# Patient Record
Sex: FEMALE | Race: WHITE | HISPANIC OR LATINO | Employment: UNEMPLOYED | URBAN - METROPOLITAN AREA
[De-identification: names, ages, dates, MRNs, and addresses within clinical notes are randomized per-mention and may not be internally consistent; named-entity substitution may affect disease eponyms.]

---

## 2018-08-20 ENCOUNTER — APPOINTMENT (OUTPATIENT)
Dept: RADIOLOGY | Facility: MEDICAL CENTER | Age: 28
End: 2018-08-20
Attending: EMERGENCY MEDICINE
Payer: COMMERCIAL

## 2018-08-20 ENCOUNTER — HOSPITAL ENCOUNTER (OUTPATIENT)
Facility: MEDICAL CENTER | Age: 28
End: 2018-08-20
Attending: EMERGENCY MEDICINE | Admitting: INTERNAL MEDICINE
Payer: COMMERCIAL

## 2018-08-20 ENCOUNTER — PATIENT OUTREACH (OUTPATIENT)
Dept: HEALTH INFORMATION MANAGEMENT | Facility: OTHER | Age: 28
End: 2018-08-20

## 2018-08-20 VITALS
HEIGHT: 63 IN | WEIGHT: 131.17 LBS | TEMPERATURE: 98.2 F | OXYGEN SATURATION: 96 % | RESPIRATION RATE: 16 BRPM | BODY MASS INDEX: 23.24 KG/M2 | SYSTOLIC BLOOD PRESSURE: 103 MMHG | DIASTOLIC BLOOD PRESSURE: 67 MMHG | HEART RATE: 61 BPM

## 2018-08-20 DIAGNOSIS — N20.0 NEPHROLITHIASIS: ICD-10-CM

## 2018-08-20 DIAGNOSIS — N20.1 URETEROLITHIASIS: ICD-10-CM

## 2018-08-20 LAB
ALBUMIN SERPL BCP-MCNC: 4.2 G/DL (ref 3.2–4.9)
ALBUMIN/GLOB SERPL: 1.4 G/DL
ALP SERPL-CCNC: 83 U/L (ref 30–99)
ALT SERPL-CCNC: 24 U/L (ref 2–50)
AMORPH CRY #/AREA URNS HPF: PRESENT /HPF
ANION GAP SERPL CALC-SCNC: 10 MMOL/L (ref 0–11.9)
APPEARANCE UR: ABNORMAL
AST SERPL-CCNC: 22 U/L (ref 12–45)
BACTERIA #/AREA URNS HPF: ABNORMAL /HPF
BASOPHILS # BLD AUTO: 0.6 % (ref 0–1.8)
BASOPHILS # BLD: 0.07 K/UL (ref 0–0.12)
BILIRUB SERPL-MCNC: 0.3 MG/DL (ref 0.1–1.5)
BILIRUB UR QL STRIP.AUTO: NEGATIVE
BUN SERPL-MCNC: 15 MG/DL (ref 8–22)
CALCIUM SERPL-MCNC: 9.3 MG/DL (ref 8.5–10.5)
CHLORIDE SERPL-SCNC: 103 MMOL/L (ref 96–112)
CO2 SERPL-SCNC: 25 MMOL/L (ref 20–33)
COLOR UR: YELLOW
CREAT SERPL-MCNC: 0.97 MG/DL (ref 0.5–1.4)
EOSINOPHIL # BLD AUTO: 0.07 K/UL (ref 0–0.51)
EOSINOPHIL NFR BLD: 0.6 % (ref 0–6.9)
EPI CELLS #/AREA URNS HPF: ABNORMAL /HPF
ERYTHROCYTE [DISTWIDTH] IN BLOOD BY AUTOMATED COUNT: 39.5 FL (ref 35.9–50)
GLOBULIN SER CALC-MCNC: 2.9 G/DL (ref 1.9–3.5)
GLUCOSE SERPL-MCNC: 137 MG/DL (ref 65–99)
GLUCOSE UR STRIP.AUTO-MCNC: NEGATIVE MG/DL
HCG SERPL QL: NEGATIVE
HCT VFR BLD AUTO: 40.2 % (ref 37–47)
HGB BLD-MCNC: 13.8 G/DL (ref 12–16)
HYALINE CASTS #/AREA URNS LPF: ABNORMAL /LPF
IMM GRANULOCYTES # BLD AUTO: 0.06 K/UL (ref 0–0.11)
IMM GRANULOCYTES NFR BLD AUTO: 0.5 % (ref 0–0.9)
KETONES UR STRIP.AUTO-MCNC: 15 MG/DL
LEUKOCYTE ESTERASE UR QL STRIP.AUTO: ABNORMAL
LIPASE SERPL-CCNC: 15 U/L (ref 11–82)
LYMPHOCYTES # BLD AUTO: 1.34 K/UL (ref 1–4.8)
LYMPHOCYTES NFR BLD: 11.2 % (ref 22–41)
MAGNESIUM SERPL-MCNC: 1.7 MG/DL (ref 1.5–2.5)
MCH RBC QN AUTO: 30.4 PG (ref 27–33)
MCHC RBC AUTO-ENTMCNC: 34.3 G/DL (ref 33.6–35)
MCV RBC AUTO: 88.5 FL (ref 81.4–97.8)
MICRO URNS: ABNORMAL
MONOCYTES # BLD AUTO: 0.46 K/UL (ref 0–0.85)
MONOCYTES NFR BLD AUTO: 3.9 % (ref 0–13.4)
NEUTROPHILS # BLD AUTO: 9.93 K/UL (ref 2–7.15)
NEUTROPHILS NFR BLD: 83.2 % (ref 44–72)
NITRITE UR QL STRIP.AUTO: NEGATIVE
NRBC # BLD AUTO: 0 K/UL
NRBC BLD-RTO: 0 /100 WBC
PH UR STRIP.AUTO: >=9 [PH]
PLATELET # BLD AUTO: 292 K/UL (ref 164–446)
PMV BLD AUTO: 10.3 FL (ref 9–12.9)
POTASSIUM SERPL-SCNC: 3.7 MMOL/L (ref 3.6–5.5)
PROT SERPL-MCNC: 7.1 G/DL (ref 6–8.2)
PROT UR QL STRIP: 100 MG/DL
RBC # BLD AUTO: 4.54 M/UL (ref 4.2–5.4)
RBC # URNS HPF: >150 /HPF
RBC UR QL AUTO: ABNORMAL
SODIUM SERPL-SCNC: 138 MMOL/L (ref 135–145)
SP GR UR STRIP.AUTO: 1.03
UROBILINOGEN UR STRIP.AUTO-MCNC: 1 MG/DL
WBC # BLD AUTO: 11.9 K/UL (ref 4.8–10.8)
WBC #/AREA URNS HPF: ABNORMAL /HPF

## 2018-08-20 PROCEDURE — 84703 CHORIONIC GONADOTROPIN ASSAY: CPT

## 2018-08-20 PROCEDURE — 99236 HOSP IP/OBS SAME DATE HI 85: CPT | Performed by: INTERNAL MEDICINE

## 2018-08-20 PROCEDURE — 99285 EMERGENCY DEPT VISIT HI MDM: CPT

## 2018-08-20 PROCEDURE — G0378 HOSPITAL OBSERVATION PER HR: HCPCS

## 2018-08-20 PROCEDURE — 700105 HCHG RX REV CODE 258: Performed by: EMERGENCY MEDICINE

## 2018-08-20 PROCEDURE — 83690 ASSAY OF LIPASE: CPT

## 2018-08-20 PROCEDURE — 700102 HCHG RX REV CODE 250 W/ 637 OVERRIDE(OP): Performed by: INTERNAL MEDICINE

## 2018-08-20 PROCEDURE — 96375 TX/PRO/DX INJ NEW DRUG ADDON: CPT

## 2018-08-20 PROCEDURE — 85025 COMPLETE CBC W/AUTO DIFF WBC: CPT

## 2018-08-20 PROCEDURE — 700105 HCHG RX REV CODE 258: Performed by: INTERNAL MEDICINE

## 2018-08-20 PROCEDURE — 96365 THER/PROPH/DIAG IV INF INIT: CPT

## 2018-08-20 PROCEDURE — 83735 ASSAY OF MAGNESIUM: CPT

## 2018-08-20 PROCEDURE — 81001 URINALYSIS AUTO W/SCOPE: CPT

## 2018-08-20 PROCEDURE — A9270 NON-COVERED ITEM OR SERVICE: HCPCS | Performed by: INTERNAL MEDICINE

## 2018-08-20 PROCEDURE — 700111 HCHG RX REV CODE 636 W/ 250 OVERRIDE (IP): Performed by: EMERGENCY MEDICINE

## 2018-08-20 PROCEDURE — 87040 BLOOD CULTURE FOR BACTERIA: CPT

## 2018-08-20 PROCEDURE — 76830 TRANSVAGINAL US NON-OB: CPT

## 2018-08-20 PROCEDURE — 74176 CT ABD & PELVIS W/O CONTRAST: CPT

## 2018-08-20 PROCEDURE — 80053 COMPREHEN METABOLIC PANEL: CPT

## 2018-08-20 RX ORDER — TAMSULOSIN HYDROCHLORIDE 0.4 MG/1
0.4 CAPSULE ORAL DAILY
Qty: 30 CAP | Refills: 0 | Status: SHIPPED | OUTPATIENT
Start: 2018-08-20

## 2018-08-20 RX ORDER — OXYCODONE HYDROCHLORIDE 5 MG/1
2.5-5 TABLET ORAL
Qty: 30 TAB | Refills: 0 | Status: ON HOLD | OUTPATIENT
Start: 2018-08-20 | End: 2018-08-24

## 2018-08-20 RX ORDER — ONDANSETRON 4 MG/1
4 TABLET, ORALLY DISINTEGRATING ORAL EVERY 4 HOURS PRN
Qty: 20 TAB | Refills: 0 | Status: SHIPPED | OUTPATIENT
Start: 2018-08-20

## 2018-08-20 RX ORDER — ENALAPRILAT 1.25 MG/ML
1.25 INJECTION INTRAVENOUS EVERY 6 HOURS PRN
Status: DISCONTINUED | OUTPATIENT
Start: 2018-08-20 | End: 2018-08-20 | Stop reason: HOSPADM

## 2018-08-20 RX ORDER — OXYCODONE HYDROCHLORIDE 10 MG/1
10 TABLET ORAL
Status: DISCONTINUED | OUTPATIENT
Start: 2018-08-20 | End: 2018-08-20 | Stop reason: HOSPADM

## 2018-08-20 RX ORDER — ONDANSETRON 4 MG/1
4 TABLET, ORALLY DISINTEGRATING ORAL EVERY 4 HOURS PRN
Status: DISCONTINUED | OUTPATIENT
Start: 2018-08-20 | End: 2018-08-20 | Stop reason: HOSPADM

## 2018-08-20 RX ORDER — BISACODYL 10 MG
10 SUPPOSITORY, RECTAL RECTAL
Status: DISCONTINUED | OUTPATIENT
Start: 2018-08-20 | End: 2018-08-20 | Stop reason: HOSPADM

## 2018-08-20 RX ORDER — ACETAMINOPHEN 325 MG/1
650 TABLET ORAL EVERY 6 HOURS PRN
Status: DISCONTINUED | OUTPATIENT
Start: 2018-08-20 | End: 2018-08-20 | Stop reason: HOSPADM

## 2018-08-20 RX ORDER — SODIUM CHLORIDE 9 MG/ML
1000 INJECTION, SOLUTION INTRAVENOUS ONCE
Status: COMPLETED | OUTPATIENT
Start: 2018-08-20 | End: 2018-08-20

## 2018-08-20 RX ORDER — KETOROLAC TROMETHAMINE 30 MG/ML
15 INJECTION, SOLUTION INTRAMUSCULAR; INTRAVENOUS ONCE
Status: COMPLETED | OUTPATIENT
Start: 2018-08-20 | End: 2018-08-20

## 2018-08-20 RX ORDER — LABETALOL HYDROCHLORIDE 5 MG/ML
10 INJECTION, SOLUTION INTRAVENOUS EVERY 4 HOURS PRN
Status: DISCONTINUED | OUTPATIENT
Start: 2018-08-20 | End: 2018-08-20 | Stop reason: HOSPADM

## 2018-08-20 RX ORDER — PROMETHAZINE HYDROCHLORIDE 12.5 MG/1
12.5-25 SUPPOSITORY RECTAL EVERY 4 HOURS PRN
Status: DISCONTINUED | OUTPATIENT
Start: 2018-08-20 | End: 2018-08-20 | Stop reason: HOSPADM

## 2018-08-20 RX ORDER — MORPHINE SULFATE 10 MG/ML
6 INJECTION, SOLUTION INTRAMUSCULAR; INTRAVENOUS ONCE
Status: COMPLETED | OUTPATIENT
Start: 2018-08-20 | End: 2018-08-20

## 2018-08-20 RX ORDER — POLYETHYLENE GLYCOL 3350 17 G/17G
1 POWDER, FOR SOLUTION ORAL
Status: DISCONTINUED | OUTPATIENT
Start: 2018-08-20 | End: 2018-08-20 | Stop reason: HOSPADM

## 2018-08-20 RX ORDER — AMOXICILLIN 250 MG
2 CAPSULE ORAL 2 TIMES DAILY
Status: DISCONTINUED | OUTPATIENT
Start: 2018-08-20 | End: 2018-08-20 | Stop reason: HOSPADM

## 2018-08-20 RX ORDER — SODIUM CHLORIDE 9 MG/ML
INJECTION, SOLUTION INTRAVENOUS CONTINUOUS
Status: DISCONTINUED | OUTPATIENT
Start: 2018-08-20 | End: 2018-08-20 | Stop reason: HOSPADM

## 2018-08-20 RX ORDER — CLONIDINE HYDROCHLORIDE 0.1 MG/1
0.1 TABLET ORAL EVERY 6 HOURS PRN
Status: DISCONTINUED | OUTPATIENT
Start: 2018-08-20 | End: 2018-08-20 | Stop reason: HOSPADM

## 2018-08-20 RX ORDER — PROMETHAZINE HYDROCHLORIDE 25 MG/1
12.5-25 TABLET ORAL EVERY 4 HOURS PRN
Status: DISCONTINUED | OUTPATIENT
Start: 2018-08-20 | End: 2018-08-20 | Stop reason: HOSPADM

## 2018-08-20 RX ORDER — MORPHINE SULFATE 4 MG/ML
4 INJECTION, SOLUTION INTRAMUSCULAR; INTRAVENOUS
Status: DISCONTINUED | OUTPATIENT
Start: 2018-08-20 | End: 2018-08-20 | Stop reason: HOSPADM

## 2018-08-20 RX ORDER — CEFDINIR 300 MG/1
300 CAPSULE ORAL 2 TIMES DAILY
Qty: 14 CAP | Refills: 0 | Status: ON HOLD | OUTPATIENT
Start: 2018-08-20 | End: 2018-08-24

## 2018-08-20 RX ORDER — OXYCODONE HYDROCHLORIDE 5 MG/1
5 TABLET ORAL
Status: DISCONTINUED | OUTPATIENT
Start: 2018-08-20 | End: 2018-08-20 | Stop reason: HOSPADM

## 2018-08-20 RX ORDER — ONDANSETRON 2 MG/ML
4 INJECTION INTRAMUSCULAR; INTRAVENOUS ONCE
Status: COMPLETED | OUTPATIENT
Start: 2018-08-20 | End: 2018-08-20

## 2018-08-20 RX ORDER — KETOROLAC TROMETHAMINE 30 MG/ML
30 INJECTION, SOLUTION INTRAMUSCULAR; INTRAVENOUS EVERY 6 HOURS PRN
Status: DISCONTINUED | OUTPATIENT
Start: 2018-08-20 | End: 2018-08-20 | Stop reason: HOSPADM

## 2018-08-20 RX ORDER — ONDANSETRON 2 MG/ML
4 INJECTION INTRAMUSCULAR; INTRAVENOUS EVERY 4 HOURS PRN
Status: DISCONTINUED | OUTPATIENT
Start: 2018-08-20 | End: 2018-08-20 | Stop reason: HOSPADM

## 2018-08-20 RX ADMIN — MORPHINE SULFATE 6 MG: 10 INJECTION INTRAVENOUS at 06:01

## 2018-08-20 RX ADMIN — SODIUM CHLORIDE: 9 INJECTION, SOLUTION INTRAVENOUS at 09:10

## 2018-08-20 RX ADMIN — CEFTRIAXONE SODIUM 1 G: 1 INJECTION, POWDER, FOR SOLUTION INTRAMUSCULAR; INTRAVENOUS at 06:56

## 2018-08-20 RX ADMIN — ONDANSETRON 4 MG: 2 INJECTION, SOLUTION INTRAMUSCULAR; INTRAVENOUS at 03:36

## 2018-08-20 RX ADMIN — OXYCODONE HYDROCHLORIDE 10 MG: 10 TABLET ORAL at 08:28

## 2018-08-20 RX ADMIN — SODIUM CHLORIDE 1000 ML: 9 INJECTION, SOLUTION INTRAVENOUS at 03:36

## 2018-08-20 RX ADMIN — KETOROLAC TROMETHAMINE 15 MG: 30 INJECTION, SOLUTION INTRAMUSCULAR at 03:36

## 2018-08-20 ASSESSMENT — PATIENT HEALTH QUESTIONNAIRE - PHQ9
SUM OF ALL RESPONSES TO PHQ9 QUESTIONS 1 AND 2: 0
1. LITTLE INTEREST OR PLEASURE IN DOING THINGS: NOT AT ALL
2. FEELING DOWN, DEPRESSED, IRRITABLE, OR HOPELESS: NOT AT ALL

## 2018-08-20 ASSESSMENT — PAIN SCALES - WONG BAKER: WONGBAKER_NUMERICALRESPONSE: HURTS AS MUCH AS POSSIBLE

## 2018-08-20 ASSESSMENT — COPD QUESTIONNAIRES
DURING THE PAST 4 WEEKS HOW MUCH DID YOU FEEL SHORT OF BREATH: NONE/LITTLE OF THE TIME
IN THE PAST 12 MONTHS DO YOU DO LESS THAN YOU USED TO BECAUSE OF YOUR BREATHING PROBLEMS: DISAGREE/UNSURE
DO YOU EVER COUGH UP ANY MUCUS OR PHLEGM?: NO/ONLY WITH OCCASIONAL COLDS OR INFECTIONS
DURING THE PAST 4 WEEKS HOW MUCH DID YOU FEEL SHORT OF BREATH: NONE/LITTLE OF THE TIME
COPD SCREENING SCORE: 0
HAVE YOU SMOKED AT LEAST 100 CIGARETTES IN YOUR ENTIRE LIFE: NO/DON'T KNOW
DO YOU EVER COUGH UP ANY MUCUS OR PHLEGM?: NO/ONLY WITH OCCASIONAL COLDS OR INFECTIONS
HAVE YOU SMOKED AT LEAST 100 CIGARETTES IN YOUR ENTIRE LIFE: NO/DON'T KNOW
COPD SCREENING SCORE: 0

## 2018-08-20 ASSESSMENT — LIFESTYLE VARIABLES
ALCOHOL_USE: NO
EVER_SMOKED: NEVER
EVER_SMOKED: NEVER

## 2018-08-20 ASSESSMENT — PAIN SCALES - GENERAL
PAINLEVEL_OUTOF10: 10
PAINLEVEL_OUTOF10: 10

## 2018-08-20 NOTE — ED PROVIDER NOTES
"ED Provider Note    Scribed for Colleen Montilla M.D. by Celestina Pineda. 8/20/2018  3:12 AM    Means of arrival: Walk in  History obtained from: Patient   History limited by: None     CHIEF COMPLAINT  Chief Complaint   Patient presents with   • LLQ Pain     since aprox 2200 (8/19). Pt reports 10/10 \"stabbing\" non radiating pain     HPI  Katja Salomon is a 28 y.o. otherwise healthy female who presents to the Emergency Department for 1 day history of abdominal pain. She endorses severe \"pressure-like\" left lower quadrant abdominal pain onset 3 pm.  She denies radiation of the pain.  Patient reports the pain subsided since onset then returned around 9 pm with worsening severity. Patient reports associated multiple episodes of nonbloody, nonbilious vomiting. She denies associated diarrhea, dysuria, or blood in urine. Patient is currently on her menstrual period in addition to reporting a recently negative home pregnancy test.    REVIEW OF SYSTEMS  Pertinent positive include suprapubic pain, vomiting.   Pertinent negative include diarrhea, dysuria, or hematuria.   All other systems reviewed and are negative. C.     PAST MEDICAL HISTORY  none    SOCIAL HISTORY  Social History     Social History Main Topics   • Smoking status: Never Smoker   • Smokeless tobacco: Never Used   • Alcohol use No   • Drug use: No   • Sexual activity: None noted      SURGICAL HISTORY  patient denies any surgical history    CURRENT MEDICATIONS  Home Medications     Reviewed by Disha Olvera R.N. (Registered Nurse) on 08/20/18 at 0305  Med List Status: Complete   Medication Last Dose Status        Patient Denies Taking any Medications                     ALLERGIES  No Known Allergies    PHYSICAL EXAM   VITAL SIGNS: /76   Pulse 65   Temp 36.3 °C (97.3 °F)   Resp 16   Ht 1.6 m (5' 2.99\")   Wt 59.4 kg (130 lb 15.3 oz)   SpO2 100%   BMI 23.20 kg/m²    Constitutional: Non toxic appearing  female, Alert in no apparent " "distress.  HENT: Normocephalic, Atraumatic. Bilateral external ears normal. Nose normal.  Moist mucous membranes.  Oropharynx clear.  Eyes: Pupils are equal and reactive. Conjunctiva normal.   Neck: Supple, full range of motion  Heart: Regular rate and rhythm.  No murmurs.    Lungs: No respiratory distress, normal work of breathing. Lungs clear to auscultation bilaterally.  Abdomen: Left lower quadrant tenderness to palpation, No rebound, No guarding, Soft, no distention.   Musculoskeletal: Atraumatic. No obvious deformities noted.  No lower extremity edema.  Skin: Warm, Dry.  No erythema, No rash.   Neurologic: Alert and oriented x3. Moving all extremities spontaneously without focal deficits.  Psychiatric: Affect normal, Mood normal, Appears appropriate and not intoxicated.      DIAGNOSTIC STUDIES    LABS  Personally reviewed by me  Labs Reviewed   CBC WITH DIFFERENTIAL - Abnormal; Notable for the following:        Result Value    WBC 11.9 (*)     Neutrophils-Polys 83.20 (*)     Lymphocytes 11.20 (*)     Neutrophils (Absolute) 9.93 (*)     All other components within normal limits   COMP METABOLIC PANEL - Abnormal; Notable for the following:     Glucose 137 (*)     All other components within normal limits   URINALYSIS,CULTURE IF INDICATED - Abnormal; Notable for the following:     Character Turbid (*)     Ph >=9.0 (*)     Ketones 15 (*)     Protein 100 (*)     Leukocyte Esterase Small (*)     Occult Blood Large (*)     All other components within normal limits   URINE MICROSCOPIC (W/UA) - Abnormal; Notable for the following:     WBC 10-20 (*)     RBC >150 (*)     Bacteria Moderate (*)     Epithelial Cells Many (*)     Hyaline Cast 3-5 (*)     All other components within normal limits   LIPASE   HCG QUAL SERUM   ESTIMATED GFR   BLOOD CULTURE    Narrative:     Per Hospital Policy: Only change Specimen Src: to \"Line\" if  specified by physician order.   BLOOD CULTURE    Narrative:     Per Hospital Policy: Only change " "Specimen Src: to \"Line\" if  specified by physician order.      RADIOLOGY  Personally reviewed by me    CT-RENAL COLIC EVALUATION(A/P W/O)   Final Result         1. Obstructing 5 mm calculus in the distal left ureter with moderate left hydronephrosis.      2. No evidence of inflammatory change in the abdomen or pelvis.  The study is however limited due to nonuse of intravenous contrast      US-GYN-PELVIS TRANSVAGINAL   Final Result            1. Unremarkable uterus and bilateral ovaries.          ED COURSE  Vitals:    08/20/18 0500 08/20/18 0600 08/20/18 0630 08/20/18 0700   BP:       Pulse: 70 72 74 73   Resp: 16 15 18 19   Temp:       SpO2: 99% 96% 95% 97%   Weight:       Height:         Medications administered:  Medications   cefTRIAXone (ROCEPHIN) 1 g in  mL IVPB (1 g Intravenous New Bag 8/20/18 0656)   NS infusion 1,000 mL (0 mL Intravenous Stopped 8/20/18 0435)   ketorolac (TORADOL) injection 15 mg (15 mg Intravenous Given 8/20/18 0336)   ondansetron (ZOFRAN) syringe/vial injection 4 mg (4 mg Intravenous Given 8/20/18 0336)   morphine (pf) 10 mg/ml 10 MG/ML injection 6 mg (6 mg Intravenous Given 8/20/18 0601)       3:12 AM Patient seen and examined at bedside. The patient presents with left lower quadrant abdominal pain. Ordered for HCG qual serum, UA culture, CBC, CMP, Lipase to evaluate. The patient will be resuscitated with 1L NS IV secondary to vomiting and possible dehydration. Patient will be treated with 4 mg Zofran and 15 mg Toradol for her symptoms.     5:17 AM Upon reassessment, patient is resting with normal vital signs. Patient reports symptoms improvement after treatment with IV fluids but reports persistent abdominal pain. Patient will be treated with additional 10 mg/ml morphine. Ordered US gyn pelvis and CT renal colic for further evaluation.     MEDICAL DECISION MAKING  Patient is otherwise healthy female who presents with acute onset of left-sided abdominal pain today.  She is afebrile " with reassuring vitals on arrival and nontoxic-appearing.  Exam not concerning for bowel obstruction or perforation or appendicitis.  Patient is not pregnant.  Labs demonstrate evidence of mild leukocytosis without other significant abnormality.  Pelvic ultrasound without evidence of ovarian torsion or cyst.  Urinalysis demonstrates possible infection in addition to blood, therefore CT scan was completed which demonstrates 5 mm obstructing ureteral stone.    I discussed the case with Dr. Foreman, urologist on-call, who recommends admission with antibiotic therapy in case of developing infection.  He will evaluate the patient for stent placement later today.    I discussed the patient with Dr. Baxter, hospitalist on-call, who accepts admission of the patient.  Patient was updated on plan of care and is stable at this time for transfer to the floor.        FINAL IMPRESSION  1. Nephrolithiasis        -ADMIT-       Results, diagnoses, and treatment options were discussed with the patient and/or family. Patient verbalized understanding of plan of care.    New Prescriptions    No medications on file     Celestina WADE (Nikita), am scribing for, and in the presence of, Colleen Montilla M.D..  Electronically signed by: Celestina Pineda (Stephaniee), 8/20/2018  IColleen M.D. personally performed the services described in this documentation, as scribed by Celestina Pineda in my presence, and it is both accurate and complete.    The note accurately reflects work and decisions made by me.  Colleen Montilla  8/20/2018  7:16 AM

## 2018-08-20 NOTE — DISCHARGE SUMMARY
"Discharge Summary    CHIEF COMPLAINT ON ADMISSION  Chief Complaint   Patient presents with   • LLQ Pain     since aprox 2200 (8/19). Pt reports 10/10 \"stabbing\" non radiating pain       Reason for Admission  Stomach Pain      Admission Date  8/20/2018    CODE STATUS  Full Code    HPI & HOSPITAL COURSE  This is a 28 y.o. female without much past medical history, who was visiting from [facility], who was doing well until the day prior to admission, when she has had acute onset left lower quadrant pain, which she described as sharp, constant, and rated 10 out of 10 in severity, without any radiation. The pain was so severe that she felt nauseated, with episodes of vomiting.  She denied any fevers, but felt chilly.  She denied any other symptoms such as shortness of breath, diarrhea, or dysuria.      The patient was initially evaluated in the emergency department, was maintaining good hemodynamics, and afebrile.  Initial blood workup showed WBC count of 11,900 without bandemia.  CMP was unremarkable.  Urinalysis showed 10-20 WBC, with greater than 150 RBC, small leukocyte Estrace, and moderate bacteria.  CT renal colic was obtained which showed an obstructing 5 mm calculus in the distal left ureter with moderate left hydronephrosis, with no evidence of inflammatory changes.  Urology was consulted, and patient was given a dose of ceftriaxone Toradol and morphine.      She was started on aggressive IV fluids, and pain control, along with Flomax.  She was also started on antibiotics empirically.  Urology was consulted, and gave the opinion that she had a 50-70% chance of passing the stone spontaneously without requiring operative intervention.  As her pain was much improved, urology recommended that she can be discharged with continued pain control, empiric antibiotics, and antiemetics.  She is counseled to strain her urine, and to continue with increased oral fluid intake.  She was given the option to follow-up " with Dr. Vanegas of urology if she wants to, as she lives in [place].     She remained hemodynamically stable and afebrile.  With her clinical improvement, she was deemed ready to be discharged from the hospital she did not have any further inpatient needs.  Patient felt comfortable going home.     Therefore, she is discharged in fair and stable condition to home with close outpatient follow-up.      Discharge Date  8/20/2018      FOLLOW UP ITEMS POST DISCHARGE  -Continue Omnicef, Zofran, and as needed oxycodone for pain.  I will also continue her on tamsulosin for medical expulsive therapy.  She will strain her urine.  Counseled to increase oral fluid intake.  She can follow-up with Dr. Vanegas if she chooses.  Follow-up with PCP.    DISCHARGE DIAGNOSES  Principal Problem:    Left ureterolithiasis with moderate hydronephrosis POA: Yes  Resolved Problems:    * No resolved hospital problems. *      FOLLOW UP    [address] [address] [phone]    Please call to schedule your appointment. They do have a sliding fee scale based on income. Thank you       MEDICATIONS ON DISCHARGE     Medication List      START taking these medications      Instructions   cefdinir 300 MG Caps  Commonly known as:  OMNICEF   Take 1 Cap by mouth 2 times a day for 7 days.  Dose:  300 mg     ondansetron 4 MG Tbdp  Commonly known as:  ZOFRAN ODT   Take 1 Tab by mouth every four hours as needed for Nausea.  Dose:  4 mg     oxyCODONE immediate-release 5 MG Tabs  Commonly known as:  ROXICODONE   Take 0.5-1 Tabs by mouth every 3 hours as needed for Severe Pain for up to 15 days.  Dose:  2.5-5 mg     tamsulosin 0.4 MG capsule  Commonly known as:  FLOMAX   Take 1 Cap by mouth every day.  Dose:  0.4 mg            Allergies  No Known Allergies    DIET  Orders Placed This Encounter   Procedures   • Diet Order Regular     Standing Status:   Standing     Number of Occurrences:   1     Order Specific  Question:   Diet:     Answer:   Regular [1]       ACTIVITY  As tolerated.  Weight bearing as tolerated    CONSULTATIONS  Urology    PROCEDURES  None    LABORATORY  Lab Results   Component Value Date    SODIUM 138 08/20/2018    POTASSIUM 3.7 08/20/2018    CHLORIDE 103 08/20/2018    CO2 25 08/20/2018    GLUCOSE 137 (H) 08/20/2018    BUN 15 08/20/2018    CREATININE 0.97 08/20/2018        Lab Results   Component Value Date    WBC 11.9 (H) 08/20/2018    HEMOGLOBIN 13.8 08/20/2018    HEMATOCRIT 40.2 08/20/2018    PLATELETCT 292 08/20/2018        For purpose of time-based billing, I spent a total face-to-face time 61 minutes, with same-day admission and discharge, including reviewing related clinical data, obtaining relevant patient information, managing medical issues, patient counseling, and coordination of care, with > 50% spent coordinating  care, and counseling regarding diagnosis, risk and benefits of various treatment plans, and expected outcomes.

## 2018-08-20 NOTE — H&P
Hospital Medicine History & Physical Note    Date of Service  8/20/2018    Primary Care Physician  No primary care provider on file.    Consultants  Dr. Foreman - urology    Code Status  Full    Chief Complaint  LLQ pain    History of Presenting Illness  28 y.o. female without much past medical history, who was visiting from [facility], who was doing well until yesterday, when she has had acute onset left lower quadrant pain, which she described as sharp, constant, and rated 10 out of 10 in severity, without any radiation. The pain was so severe that she felt nauseated, with episodes of vomiting.  She denied any fevers, but felt chilly.  She denied any other symptoms such as shortness of breath, diarrhea, or dysuria.  She then decided to go to the ED today.    ED course:  The patient was initially evaluated in the emergency department, was maintaining good hemodynamics, and afebrile.  Initial blood workup showed WBC count of 11,900 without bandemia.  CMP was unremarkable.  Urinalysis showed 10-20 WBC, with greater than 150 RBC, small leukocyte Estrace, and moderate bacteria.  CT renal colic was obtained which showed an obstructing 5 mm calculus in the distal left ureter with moderate left hydronephrosis, with no evidence of inflammatory changes.  Urology was consulted, and patient was given a dose of ceftriaxone Toradol and morphine.  She was subsequently admitted to the hospitalist service.    Review of Systems  ROS    Past Medical History  No medical issues    Surgical History   has no past surgical history on file.     Family History  Reviewed. Non-contributory.      Social History   reports that she has never smoked. She has never used smokeless tobacco. She reports that she does not drink alcohol or use drugs.    Allergies  No Known Allergies    Medications  None       Physical Exam  Blood Pressure: 110/70   Temperature: 37 °C (98.6 °F)   Pulse: 67   Respiration: 14   Pulse Oximetry: 97 %     Physical  Exam   Constitutional: She is oriented to person, place, and time. She appears well-developed and well-nourished. No distress.   HENT:   Head: Normocephalic and atraumatic.   Mouth/Throat: No oropharyngeal exudate.   Eyes: Pupils are equal, round, and reactive to light. Conjunctivae are normal. No scleral icterus.   Neck: Normal range of motion. Neck supple.   Cardiovascular: Normal rate and regular rhythm.  Exam reveals no gallop and no friction rub.    No murmur heard.  Pulmonary/Chest: Effort normal and breath sounds normal. No respiratory distress. She has no wheezes. She has no rales. She exhibits no tenderness.   Abdominal: Soft. Bowel sounds are normal. She exhibits no distension. There is tenderness ( LLQ). There is no rebound and no guarding.   Musculoskeletal: Normal range of motion. She exhibits no edema or tenderness.   Lymphadenopathy:     She has no cervical adenopathy.   Neurological: She is alert and oriented to person, place, and time. No cranial nerve deficit. Coordination normal.   Skin: Skin is warm and dry. No rash noted. She is not diaphoretic. No erythema. No pallor.   Psychiatric: She has a normal mood and affect. Her behavior is normal. Judgment and thought content normal.   Nursing note and vitals reviewed.      Laboratory:  Recent Labs      08/20/18   0300   WBC  11.9*   RBC  4.54   HEMOGLOBIN  13.8   HEMATOCRIT  40.2   MCV  88.5   MCH  30.4   MCHC  34.3   RDW  39.5   PLATELETCT  292   MPV  10.3     Recent Labs      08/20/18   0300   SODIUM  138   POTASSIUM  3.7   CHLORIDE  103   CO2  25   GLUCOSE  137*   BUN  15   CREATININE  0.97   CALCIUM  9.3     Recent Labs      08/20/18   0300   ALTSGPT  24   ASTSGOT  22   ALKPHOSPHAT  83   TBILIRUBIN  0.3   LIPASE  15   GLUCOSE  137*                 No results found for: TROPONINI    Urinalysis:    Recent Labs      08/20/18   0338   SPECGRAVITY  1.032   GLUCOSEUR  Negative   KETONES  15*   NITRITE  Negative   LEUKESTERAS  Small*   WBCURINE  10-20*    RBCURINE  >150*   BACTERIA  Moderate*   EPITHELCELL  Many*        Imaging:  CT-RENAL COLIC EVALUATION(A/P W/O)   Final Result         1. Obstructing 5 mm calculus in the distal left ureter with moderate left hydronephrosis.      2. No evidence of inflammatory change in the abdomen or pelvis.  The study is however limited due to nonuse of intravenous contrast      US-GYN-PELVIS TRANSVAGINAL   Final Result            1. Unremarkable uterus and bilateral ovaries.            Assessment/Plan:  I anticipate this patient is appropriate for observation status at this time.    * Left ureterolithiasis with moderate hydronephrosis- (present on admission)   Assessment & Plan    -Urology has been consulted, anticipate cystoscopy with ureteral stent placement today.  I will keep her n.p.o. for now.  -I will cover her with IV ceftriaxone.  Pain control with IV Toradol, oxycodone, and IV morphine, along with as needed antiemetics.  -Aggressive IV fluid rehydration with normal saline at 150 cc/h.            VTE prophylaxis: SCD

## 2018-08-20 NOTE — ED TRIAGE NOTES
"Katja Chana Salomon  28 y.o. female  Chief Complaint   Patient presents with   • LLQ Pain     since aprox 2200 (8/19). Pt reports 10/10 \"stabbing\" non radiating pain       Pt amb to triage with steady gait for above complaint. Pt denies diarrhea, constipation or problems urinating.   LMP 8/13  Pt Panamanian speaking only  Language line used (Claire #[id number])  Pt is alert and oriented, speaking in full sentences, follows commands and responds appropriately to questions. NAD. Resp are even and unlabored.  Pt placed in lobby. Pt educated on triage process. Pt encouraged to alert staff for any changes.    "

## 2018-08-20 NOTE — ASSESSMENT & PLAN NOTE
-Urology has been consulted, anticipate cystoscopy with ureteral stent placement today.  I will keep her n.p.o. for now.  -I will cover her with IV ceftriaxone.  Pain control with IV Toradol, oxycodone, and IV morphine, along with as needed antiemetics.  -Aggressive IV fluid rehydration with normal saline at 150 cc/h.

## 2018-08-20 NOTE — DISCHARGE INSTRUCTIONS
Discharge Instructions    Discharged to home by car with relative. Discharged via wheelchair, hospital escort: Yes.  Special equipment needed: Not Applicable    Be sure to schedule a follow-up appointment with your primary care doctor or any specialists as instructed.     Discharge Plan:   Diet Plan: Discussed  Activity Level: Discussed  Confirmed Follow up Appointment: Patient to Call and Schedule Appointment  Confirmed Symptoms Management: Discussed  Medication Reconciliation Updated: Yes  Influenza Vaccine Indication: Patient Refuses    I understand that a diet low in cholesterol, fat, and sodium is recommended for good health. Unless I have been given specific instructions below for another diet, I accept this instruction as my diet prescription.   Other diet: Regular    Special Instructions: None    · Is patient discharged on Warfarin / Coumadin?   No     Depression / Suicide Risk    As you are discharged from this [facility] facility, it is important to learn how to keep safe from harming yourself.    Recognize the warning signs:  · Abrupt changes in personality, positive or negative- including increase in energy   · Giving away possessions  · Change in eating patterns- significant weight changes-  positive or negative  · Change in sleeping patterns- unable to sleep or sleeping all the time   · Unwillingness or inability to communicate  · Depression  · Unusual sadness, discouragement and loneliness  · Talk of wanting to die  · Neglect of personal appearance   · Rebelliousness- reckless behavior  · Withdrawal from people/activities they love  · Confusion- inability to concentrate     If you or a loved one observes any of these behaviors or has concerns about self-harm, here's what you can do:  · Talk about it- your feelings and reasons for harming yourself  · Remove any means that you might use to hurt yourself (examples: pills, rope, extension cords, firearm)  · Get professional help from the community  (Mental Health, Substance Abuse, psychological counseling)  · Do not be alone:Call your Safe Contact- someone whom you trust who will be there for you.  · Call your local CRISIS HOTLINE 799-5989 or 821-545-9914  · Call your local Children's Mobile Crisis Response Team [place] (814) 572-6813 or www.LXSN  · Call the toll free National Suicide Prevention Hotlines   · National Suicide Prevention Lifeline 572-107-KPCX (1825)  · National Hope Line Network 800-SUICIDE (605-9060)

## 2018-08-20 NOTE — CONSULTS
DATE OF SERVICE:  08/20/2018    UROLOGIC CONSULTATION    CONSULTATION REQUESTED BY:  Dr. Montilla regarding left distal ureterolithiasis   with intractable flank pain.    CONSULTATION PERFORMED BY:  Von Vanegas MD, [facility] [place].    CHIEF COMPLAINT:  This is a 28-year-old  female visiting from [place], [place] who has developed 10/10 left flank and lower quadrant pain.    HISTORY OF PRESENT ILLNESS:  The patient states she was in her usual state of   health until 08/19 when she began experiencing left lower quadrant pain.  She   describes the pain as sharp, intermittent and then became constant and rated   as 10/10 in severity without any fever or chills.  She denies any urgency,   frequency, or gross hematuria, but decided to go to the emergency room where a   CAT scan was performed and shows a 5 mm stone in the distal ureter.    PAST MEDICAL HISTORY:  Negative for nephrolithiasis.  She denies a history of   hypertension, diabetes or reactive airway disease.    PAST SURGICAL HISTORY:  None.    MEDICATIONS:  She is on no medications prior to admission.    ALLERGIES:  No known drug allergies.    SOCIAL HISTORY:  She is single, traveling with a friend from [place].  She   denies tobacco use, denies alcohol use and denies any drug use.    FAMILY HISTORY:  Negative for nephrolithiasis.    REVIEW OF SYSTEMS:  GENERAL:  She denies any significant weight gain or weight loss.  She has not   had fever, but has had chills with this severe pain when it occurs.  HEENT:  She denies any hearing change, loss of vision or difficulty   swallowing.  RESPIRATORY:  Denies any shortness of breath or cough.  CARDIOVASCULAR:  Denies skipped beats or dyspnea on exertion.  ABDOMEN:  She has had left lower quadrant abdominal pain, but denies blood in   the stool and she has had some nausea and episode of vomiting.  GENITOURINARY:  See HPI.  ENDOCRINE:  Denies excessive feeling of too hot or too cold.  MUSCULOSKELETAL:  Denies  arthralgias, myalgias.  NEUROLOGIC:  Denies seizures or discoordination.  PSYCHIATRIC:  Denies any anxiety or depression.    PHYSICAL EXAMINATION:  VITAL SIGNS:  Her blood pressure is 110/70, pulse is in the mid 60s,   respirations 16 and symmetric.  GENERAL:  She currently is a well-developed, well-nourished woman in no acute   distress, resting comfortably with a friend.  HEENT:  Normocephalic, atraumatic.  Pupils equal, round.  Sclerae are   nonicteric.  NECK:  Supple.  CHEST:  Clear to auscultation bilaterally.  I did not appreciate any wheezing.  CARDIOVASCULAR:  Regular rate and rhythm without a murmur.  ABDOMEN:  Soft, nondistended, mildly tender to deep palpation in the left   lower quadrant, but no rebound or guarding and no palpable masses.  BACK:  Without costovertebral tenderness.  EXTREMITIES:  Without clubbing, cyanosis or edema.  Homans sign negative.  NEUROLOGIC:  Cranial nerves II-XII intact.  Motor and sensory examination   nonfocal.    LABORATORIES AND STUDIES:  The patient's white count was 11,900.  Normal   hemoglobin.  Normal creatinine.  CT scan of the abdomen and pelvis, I   personally reviewed that, shows a 4.9 distal ureteral stone with associated   hydroureteronephrosis.  Beta-hCG was negative and the urine showed a trace or   few white cells, but no significant bacteria.    ASSESSMENT:  Distal left ureterolithiasis.    PLAN AND RECOMMENDATIONS:  I had a discussion with the patient in the presence   of her friend that essentially a 5 mm stone has a 50-70% chance of passing   spontaneously without requiring operative intervention.  She was admitted due   to the fact that she had significant pain on admission with pain management.    She is doing better.  I explained to the patient that if the stone does not   pass within 5 weeks that medical attention would be recommended as leaving a   stone in place can result in a stricture and subsequent loss of kidney   function.  At this point in  time, she has been n.p.o. at my request, but I   will recommend that she go ahead and start on a diet and potentially be   discharged later today with oral analgesics, Zofran for nausea, and tamsulosin   for possible medical expulsive therapy.  The patient appreciated information   and can follow up with me or in [place] whatever she desires.       ____________________________________     MD SAMIR MART / TRENT    DD:  08/20/2018 12:28:03  DT:  08/20/2018 13:01:51    D#:  [id number]  Job#:  [id number]

## 2018-08-20 NOTE — ED NOTES
Pt states they feel like they are going to pass out and started to feel nauseous. Pt also states the pain is getting worse. Pt was given warm blanket. Triage RN nurse notified.

## 2018-08-20 NOTE — ED NOTES
Pt updated on POC, pt rr even and unlabored. Pt has no new complaints at this time, will continue to monitor pt.

## 2018-08-20 NOTE — DISCHARGE PLANNING
Care Transition Team Assessment    Met with pt, boyfriend and daughter at bedside. Pt is Northern Irish speaking, translation with Adria Smith. According to pt she lives with her daughter and boyfriend. Pt said that she lives in [place] and is currently on vacation. Independent at baseline, does not use assistive devices. Boyfriend confirmed he will provide transport at discharge. Pt referred to PFA. No other needs identified at this time.    Information Source  Orientation : Oriented x 4  Information Given By: Patient  Informant's Name: Katja Salomon    Readmission Evaluation  Is this a readmission?: No    Interdisciplinary Discharge Planning  Does Admitting Nurse Feel This Could be a Complex Discharge?: No  Primary Care Physician: Loreto Cortez  Lives with - Patient's Self Care Capacity: Child Less than 18 Years of Age (Boyfriend)  Patient or legal guardian wants to designate a caregiver (see row info): No  Support Systems:  (Boyfriend)  Housing / Facility: [address]  Do You Take your Prescribed Medications Regularly:  (Not on any medication)  Able to Return to Previous ADL's: Yes  Mobility Issues: No  Prior Services: None  Patient Expects to be Discharged to:: Home  Assistance Needed: No  Durable Medical Equipment: Not Applicable    Discharge Preparedness  What are your discharge supports?:  (Boyfriend)  Prior Functional Level: Ambulatory, Independent with Activities of Daily Living, Independent with Medication Management  Difficulity with ADLs: None  Difficulity with IADLs: None    Functional Assesment  Prior Functional Level: Ambulatory, Independent with Activities of Daily Living, Independent with Medication Management    Finances  Prescription Coverage: Yes    Discharge Risks or Barriers  Discharge risks or barriers?: No    Anticipated Discharge Information  Anticipated discharge disposition: Home  Discharge Address: [address] [place] [address]  Discharge Contact Phone Number: [phone] -  patient

## 2018-08-20 NOTE — PROGRESS NOTES
Pt arrive to floor. Pt ambulate to bed with steady gait. Pt wanting to leave hospital to be treated in [place] due to her insurance. MD Aamir notified. MD Aamir to pt bedside.

## 2018-08-21 NOTE — PROGRESS NOTES
Tech Marthony at bedside for help with translation. Discharge instructions, medications and follow-up reviewed with pt, pt verbalized understanding and denies questions. Discharge paperwork and prescriptions (x4) given to pt. PIV removed, TeleBox removed, armband removed. Pt decline wheelchair or hospital escort. Pt walk off floor with her family.

## 2018-08-22 ENCOUNTER — HOSPITAL ENCOUNTER (INPATIENT)
Facility: MEDICAL CENTER | Age: 28
LOS: 1 days | DRG: 690 | End: 2018-08-24
Attending: EMERGENCY MEDICINE | Admitting: HOSPITALIST
Payer: COMMERCIAL

## 2018-08-22 DIAGNOSIS — R10.9 FLANK PAIN: ICD-10-CM

## 2018-08-22 DIAGNOSIS — N20.0 NEPHROLITHIASIS: ICD-10-CM

## 2018-08-22 PROBLEM — N30.01 ACUTE CYSTITIS WITH HEMATURIA: Status: ACTIVE | Noted: 2018-08-22

## 2018-08-22 LAB
ALBUMIN SERPL BCP-MCNC: 3.8 G/DL (ref 3.2–4.9)
ALBUMIN/GLOB SERPL: 1.3 G/DL
ALP SERPL-CCNC: 68 U/L (ref 30–99)
ALT SERPL-CCNC: 17 U/L (ref 2–50)
ANION GAP SERPL CALC-SCNC: 8 MMOL/L (ref 0–11.9)
APPEARANCE UR: ABNORMAL
AST SERPL-CCNC: 15 U/L (ref 12–45)
BACTERIA #/AREA URNS HPF: ABNORMAL /HPF
BASOPHILS # BLD AUTO: 0.4 % (ref 0–1.8)
BASOPHILS # BLD: 0.05 K/UL (ref 0–0.12)
BILIRUB SERPL-MCNC: 0.4 MG/DL (ref 0.1–1.5)
BILIRUB UR QL STRIP.AUTO: NEGATIVE
BUN SERPL-MCNC: 11 MG/DL (ref 8–22)
CALCIUM SERPL-MCNC: 8.9 MG/DL (ref 8.5–10.5)
CHLORIDE SERPL-SCNC: 101 MMOL/L (ref 96–112)
CO2 SERPL-SCNC: 25 MMOL/L (ref 20–33)
COLOR UR: YELLOW
CREAT SERPL-MCNC: 1.09 MG/DL (ref 0.5–1.4)
EOSINOPHIL # BLD AUTO: 0.09 K/UL (ref 0–0.51)
EOSINOPHIL NFR BLD: 0.7 % (ref 0–6.9)
EPI CELLS #/AREA URNS HPF: ABNORMAL /HPF
ERYTHROCYTE [DISTWIDTH] IN BLOOD BY AUTOMATED COUNT: 38.5 FL (ref 35.9–50)
GLOBULIN SER CALC-MCNC: 3 G/DL (ref 1.9–3.5)
GLUCOSE SERPL-MCNC: 96 MG/DL (ref 65–99)
GLUCOSE UR STRIP.AUTO-MCNC: NEGATIVE MG/DL
HCT VFR BLD AUTO: 36.8 % (ref 37–47)
HGB BLD-MCNC: 12.8 G/DL (ref 12–16)
HYALINE CASTS #/AREA URNS LPF: ABNORMAL /LPF
IMM GRANULOCYTES # BLD AUTO: 0.04 K/UL (ref 0–0.11)
IMM GRANULOCYTES NFR BLD AUTO: 0.3 % (ref 0–0.9)
KETONES UR STRIP.AUTO-MCNC: 40 MG/DL
LEUKOCYTE ESTERASE UR QL STRIP.AUTO: ABNORMAL
LYMPHOCYTES # BLD AUTO: 2.06 K/UL (ref 1–4.8)
LYMPHOCYTES NFR BLD: 16.3 % (ref 22–41)
MCH RBC QN AUTO: 30.5 PG (ref 27–33)
MCHC RBC AUTO-ENTMCNC: 34.8 G/DL (ref 33.6–35)
MCV RBC AUTO: 87.6 FL (ref 81.4–97.8)
MICRO URNS: ABNORMAL
MONOCYTES # BLD AUTO: 1.28 K/UL (ref 0–0.85)
MONOCYTES NFR BLD AUTO: 10.1 % (ref 0–13.4)
NEUTROPHILS # BLD AUTO: 9.11 K/UL (ref 2–7.15)
NEUTROPHILS NFR BLD: 72.2 % (ref 44–72)
NITRITE UR QL STRIP.AUTO: NEGATIVE
NRBC # BLD AUTO: 0 K/UL
NRBC BLD-RTO: 0 /100 WBC
PH UR STRIP.AUTO: 5 [PH]
PLATELET # BLD AUTO: 251 K/UL (ref 164–446)
PMV BLD AUTO: 10.1 FL (ref 9–12.9)
POTASSIUM SERPL-SCNC: 4.2 MMOL/L (ref 3.6–5.5)
PROT SERPL-MCNC: 6.8 G/DL (ref 6–8.2)
PROT UR QL STRIP: NEGATIVE MG/DL
RBC # BLD AUTO: 4.2 M/UL (ref 4.2–5.4)
RBC # URNS HPF: ABNORMAL /HPF
RBC UR QL AUTO: ABNORMAL
SODIUM SERPL-SCNC: 134 MMOL/L (ref 135–145)
SP GR UR STRIP.AUTO: 1.01
UROBILINOGEN UR STRIP.AUTO-MCNC: 0.2 MG/DL
WBC # BLD AUTO: 12.6 K/UL (ref 4.8–10.8)
WBC #/AREA URNS HPF: ABNORMAL /HPF

## 2018-08-22 PROCEDURE — 96375 TX/PRO/DX INJ NEW DRUG ADDON: CPT

## 2018-08-22 PROCEDURE — 99285 EMERGENCY DEPT VISIT HI MDM: CPT

## 2018-08-22 PROCEDURE — 700105 HCHG RX REV CODE 258: Performed by: HOSPITALIST

## 2018-08-22 PROCEDURE — 81001 URINALYSIS AUTO W/SCOPE: CPT

## 2018-08-22 PROCEDURE — G0378 HOSPITAL OBSERVATION PER HR: HCPCS

## 2018-08-22 PROCEDURE — 96367 TX/PROPH/DG ADDL SEQ IV INF: CPT

## 2018-08-22 PROCEDURE — 80053 COMPREHEN METABOLIC PANEL: CPT

## 2018-08-22 PROCEDURE — 87086 URINE CULTURE/COLONY COUNT: CPT

## 2018-08-22 PROCEDURE — 36415 COLL VENOUS BLD VENIPUNCTURE: CPT

## 2018-08-22 PROCEDURE — 700111 HCHG RX REV CODE 636 W/ 250 OVERRIDE (IP): Performed by: EMERGENCY MEDICINE

## 2018-08-22 PROCEDURE — 83036 HEMOGLOBIN GLYCOSYLATED A1C: CPT

## 2018-08-22 PROCEDURE — 99223 1ST HOSP IP/OBS HIGH 75: CPT | Performed by: HOSPITALIST

## 2018-08-22 PROCEDURE — 96365 THER/PROPH/DIAG IV INF INIT: CPT

## 2018-08-22 PROCEDURE — 85025 COMPLETE CBC W/AUTO DIFF WBC: CPT

## 2018-08-22 PROCEDURE — 700111 HCHG RX REV CODE 636 W/ 250 OVERRIDE (IP): Performed by: HOSPITALIST

## 2018-08-22 RX ORDER — ONDANSETRON 4 MG/1
4 TABLET, ORALLY DISINTEGRATING ORAL EVERY 4 HOURS PRN
Status: DISCONTINUED | OUTPATIENT
Start: 2018-08-22 | End: 2018-08-24 | Stop reason: HOSPADM

## 2018-08-22 RX ORDER — ONDANSETRON 2 MG/ML
4 INJECTION INTRAMUSCULAR; INTRAVENOUS EVERY 4 HOURS PRN
Status: DISCONTINUED | OUTPATIENT
Start: 2018-08-22 | End: 2018-08-24 | Stop reason: HOSPADM

## 2018-08-22 RX ORDER — OXYCODONE HYDROCHLORIDE 5 MG/1
5 TABLET ORAL
Status: DISCONTINUED | OUTPATIENT
Start: 2018-08-22 | End: 2018-08-23

## 2018-08-22 RX ORDER — MORPHINE SULFATE 4 MG/ML
2 INJECTION, SOLUTION INTRAMUSCULAR; INTRAVENOUS
Status: DISCONTINUED | OUTPATIENT
Start: 2018-08-22 | End: 2018-08-23

## 2018-08-22 RX ORDER — PROMETHAZINE HYDROCHLORIDE 25 MG/1
12.5-25 TABLET ORAL EVERY 4 HOURS PRN
Status: DISCONTINUED | OUTPATIENT
Start: 2018-08-22 | End: 2018-08-23

## 2018-08-22 RX ORDER — PROMETHAZINE HYDROCHLORIDE 12.5 MG/1
12.5-25 SUPPOSITORY RECTAL EVERY 4 HOURS PRN
Status: DISCONTINUED | OUTPATIENT
Start: 2018-08-22 | End: 2018-08-23

## 2018-08-22 RX ORDER — ACETAMINOPHEN 325 MG/1
650 TABLET ORAL EVERY 6 HOURS PRN
Status: DISCONTINUED | OUTPATIENT
Start: 2018-08-22 | End: 2018-08-23

## 2018-08-22 RX ORDER — SODIUM CHLORIDE 9 MG/ML
INJECTION, SOLUTION INTRAVENOUS CONTINUOUS
Status: DISCONTINUED | OUTPATIENT
Start: 2018-08-22 | End: 2018-08-23

## 2018-08-22 RX ORDER — OXYCODONE HYDROCHLORIDE 5 MG/1
2.5 TABLET ORAL
Status: DISCONTINUED | OUTPATIENT
Start: 2018-08-22 | End: 2018-08-23

## 2018-08-22 RX ORDER — MORPHINE SULFATE 4 MG/ML
4 INJECTION, SOLUTION INTRAMUSCULAR; INTRAVENOUS ONCE
Status: COMPLETED | OUTPATIENT
Start: 2018-08-22 | End: 2018-08-22

## 2018-08-22 RX ORDER — POLYETHYLENE GLYCOL 3350 17 G/17G
1 POWDER, FOR SOLUTION ORAL
Status: DISCONTINUED | OUTPATIENT
Start: 2018-08-22 | End: 2018-08-23

## 2018-08-22 RX ORDER — BISACODYL 10 MG
10 SUPPOSITORY, RECTAL RECTAL
Status: DISCONTINUED | OUTPATIENT
Start: 2018-08-22 | End: 2018-08-23

## 2018-08-22 RX ORDER — CEFTRIAXONE 1 G/1
INJECTION, POWDER, FOR SOLUTION INTRAMUSCULAR; INTRAVENOUS ONCE
Status: CANCELLED | OUTPATIENT
Start: 2018-08-22 | End: 2018-08-22

## 2018-08-22 RX ORDER — MORPHINE SULFATE 2 MG/ML
2 INJECTION, SOLUTION INTRAMUSCULAR; INTRAVENOUS
Status: DISCONTINUED | OUTPATIENT
Start: 2018-08-22 | End: 2018-08-22

## 2018-08-22 RX ORDER — TAMSULOSIN HYDROCHLORIDE 0.4 MG/1
0.4 CAPSULE ORAL DAILY
Status: DISCONTINUED | OUTPATIENT
Start: 2018-08-23 | End: 2018-08-24 | Stop reason: HOSPADM

## 2018-08-22 RX ORDER — ONDANSETRON 2 MG/ML
4 INJECTION INTRAMUSCULAR; INTRAVENOUS ONCE
Status: COMPLETED | OUTPATIENT
Start: 2018-08-22 | End: 2018-08-22

## 2018-08-22 RX ORDER — KETOROLAC TROMETHAMINE 30 MG/ML
30 INJECTION, SOLUTION INTRAMUSCULAR; INTRAVENOUS ONCE
Status: COMPLETED | OUTPATIENT
Start: 2018-08-22 | End: 2018-08-22

## 2018-08-22 RX ORDER — CLONIDINE HYDROCHLORIDE 0.1 MG/1
0.1 TABLET ORAL EVERY 6 HOURS PRN
Status: DISCONTINUED | OUTPATIENT
Start: 2018-08-22 | End: 2018-08-23

## 2018-08-22 RX ORDER — AMOXICILLIN 250 MG
2 CAPSULE ORAL 2 TIMES DAILY
Status: DISCONTINUED | OUTPATIENT
Start: 2018-08-22 | End: 2018-08-23

## 2018-08-22 RX ADMIN — ONDANSETRON 4 MG: 2 INJECTION INTRAMUSCULAR; INTRAVENOUS at 21:18

## 2018-08-22 RX ADMIN — KETOROLAC TROMETHAMINE 30 MG: 30 INJECTION, SOLUTION INTRAMUSCULAR at 21:16

## 2018-08-22 RX ADMIN — MORPHINE SULFATE 4 MG: 4 INJECTION INTRAVENOUS at 21:17

## 2018-08-22 RX ADMIN — CEFTRIAXONE 2 G: 2 INJECTION, POWDER, FOR SOLUTION INTRAMUSCULAR; INTRAVENOUS at 23:00

## 2018-08-22 ASSESSMENT — ENCOUNTER SYMPTOMS
EYES NEGATIVE: 1
CARDIOVASCULAR NEGATIVE: 1
NEUROLOGICAL NEGATIVE: 1
GASTROINTESTINAL NEGATIVE: 1
PSYCHIATRIC NEGATIVE: 1
CHILLS: 1
NAUSEA: 1
VOMITING: 1
SHORTNESS OF BREATH: 1
CONSTITUTIONAL NEGATIVE: 1
ABDOMINAL PAIN: 1
FLANK PAIN: 1

## 2018-08-22 ASSESSMENT — PAIN SCALES - GENERAL
PAINLEVEL_OUTOF10: 3
PAINLEVEL_OUTOF10: 6

## 2018-08-22 ASSESSMENT — LIFESTYLE VARIABLES: DO YOU DRINK ALCOHOL: NO

## 2018-08-23 ENCOUNTER — APPOINTMENT (OUTPATIENT)
Dept: RADIOLOGY | Facility: MEDICAL CENTER | Age: 28
DRG: 690 | End: 2018-08-23
Attending: UROLOGY
Payer: COMMERCIAL

## 2018-08-23 PROBLEM — N11.1 OBSTRUCTIVE PYELONEPHRITIS: Status: ACTIVE | Noted: 2018-08-22

## 2018-08-23 PROBLEM — N17.9 AKI (ACUTE KIDNEY INJURY) (HCC): Status: ACTIVE | Noted: 2018-08-23

## 2018-08-23 PROBLEM — D72.825 BANDEMIA: Status: ACTIVE | Noted: 2018-08-23

## 2018-08-23 PROBLEM — E87.1 HYPONATREMIA: Status: ACTIVE | Noted: 2018-08-23

## 2018-08-23 LAB
APTT PPP: 30 SEC (ref 24.7–36)
EST. AVERAGE GLUCOSE BLD GHB EST-MCNC: 114 MG/DL
HBA1C MFR BLD: 5.6 % (ref 0–5.6)
INR PPP: 1.35 (ref 0.87–1.13)
PROTHROMBIN TIME: 16.4 SEC (ref 12–14.6)

## 2018-08-23 PROCEDURE — 700111 HCHG RX REV CODE 636 W/ 250 OVERRIDE (IP)

## 2018-08-23 PROCEDURE — 160002 HCHG RECOVERY MINUTES (STAT): Performed by: UROLOGY

## 2018-08-23 PROCEDURE — G0378 HOSPITAL OBSERVATION PER HR: HCPCS

## 2018-08-23 PROCEDURE — 160009 HCHG ANES TIME/MIN: Performed by: UROLOGY

## 2018-08-23 PROCEDURE — 770006 HCHG ROOM/CARE - MED/SURG/GYN SEMI*

## 2018-08-23 PROCEDURE — 700102 HCHG RX REV CODE 250 W/ 637 OVERRIDE(OP): Performed by: HOSPITALIST

## 2018-08-23 PROCEDURE — 500880 HCHG PACK, CYSTO W/SEP LEGGINGS: Performed by: UROLOGY

## 2018-08-23 PROCEDURE — 700102 HCHG RX REV CODE 250 W/ 637 OVERRIDE(OP): Performed by: INTERNAL MEDICINE

## 2018-08-23 PROCEDURE — 700105 HCHG RX REV CODE 258: Performed by: INTERNAL MEDICINE

## 2018-08-23 PROCEDURE — 700111 HCHG RX REV CODE 636 W/ 250 OVERRIDE (IP): Performed by: INTERNAL MEDICINE

## 2018-08-23 PROCEDURE — 85610 PROTHROMBIN TIME: CPT

## 2018-08-23 PROCEDURE — 700101 HCHG RX REV CODE 250

## 2018-08-23 PROCEDURE — 501329 HCHG SET, CYSTO IRRIG Y TUR: Performed by: UROLOGY

## 2018-08-23 PROCEDURE — 160028 HCHG SURGERY MINUTES - 1ST 30 MINS LEVEL 3: Performed by: UROLOGY

## 2018-08-23 PROCEDURE — 160039 HCHG SURGERY MINUTES - EA ADDL 1 MIN LEVEL 3: Performed by: UROLOGY

## 2018-08-23 PROCEDURE — C1758 CATHETER, URETERAL: HCPCS | Performed by: UROLOGY

## 2018-08-23 PROCEDURE — 99232 SBSQ HOSP IP/OBS MODERATE 35: CPT | Performed by: INTERNAL MEDICINE

## 2018-08-23 PROCEDURE — A9270 NON-COVERED ITEM OR SERVICE: HCPCS | Performed by: INTERNAL MEDICINE

## 2018-08-23 PROCEDURE — 36415 COLL VENOUS BLD VENIPUNCTURE: CPT

## 2018-08-23 PROCEDURE — BT1F1ZZ FLUOROSCOPY OF LEFT KIDNEY, URETER AND BLADDER USING LOW OSMOLAR CONTRAST: ICD-10-PCS | Performed by: UROLOGY

## 2018-08-23 PROCEDURE — 160048 HCHG OR STATISTICAL LEVEL 1-5: Performed by: UROLOGY

## 2018-08-23 PROCEDURE — 700105 HCHG RX REV CODE 258: Performed by: HOSPITALIST

## 2018-08-23 PROCEDURE — 0TJ98ZZ INSPECTION OF URETER, VIA NATURAL OR ARTIFICIAL OPENING ENDOSCOPIC: ICD-10-PCS | Performed by: UROLOGY

## 2018-08-23 PROCEDURE — A9270 NON-COVERED ITEM OR SERVICE: HCPCS | Performed by: HOSPITALIST

## 2018-08-23 PROCEDURE — 96376 TX/PRO/DX INJ SAME DRUG ADON: CPT

## 2018-08-23 PROCEDURE — 85730 THROMBOPLASTIN TIME PARTIAL: CPT

## 2018-08-23 PROCEDURE — 700111 HCHG RX REV CODE 636 W/ 250 OVERRIDE (IP): Performed by: HOSPITALIST

## 2018-08-23 PROCEDURE — 160035 HCHG PACU - 1ST 60 MINS PHASE I: Performed by: UROLOGY

## 2018-08-23 PROCEDURE — C1769 GUIDE WIRE: HCPCS | Performed by: UROLOGY

## 2018-08-23 RX ORDER — SODIUM CHLORIDE, SODIUM LACTATE, POTASSIUM CHLORIDE, CALCIUM CHLORIDE 600; 310; 30; 20 MG/100ML; MG/100ML; MG/100ML; MG/100ML
INJECTION, SOLUTION INTRAVENOUS CONTINUOUS
Status: DISCONTINUED | OUTPATIENT
Start: 2018-08-23 | End: 2018-08-24 | Stop reason: HOSPADM

## 2018-08-23 RX ORDER — MAGNESIUM HYDROXIDE 1200 MG/15ML
LIQUID ORAL
Status: DISCONTINUED | OUTPATIENT
Start: 2018-08-23 | End: 2018-08-23 | Stop reason: HOSPADM

## 2018-08-23 RX ORDER — OXYCODONE HYDROCHLORIDE 5 MG/1
5 TABLET ORAL EVERY 4 HOURS PRN
Status: DISCONTINUED | OUTPATIENT
Start: 2018-08-23 | End: 2018-08-24 | Stop reason: HOSPADM

## 2018-08-23 RX ORDER — MAGNESIUM HYDROXIDE 1200 MG/15ML
LIQUID ORAL
Status: COMPLETED | OUTPATIENT
Start: 2018-08-23 | End: 2018-08-23

## 2018-08-23 RX ORDER — ACETAMINOPHEN 500 MG
500 TABLET ORAL EVERY 6 HOURS
Status: DISCONTINUED | OUTPATIENT
Start: 2018-08-23 | End: 2018-08-24 | Stop reason: HOSPADM

## 2018-08-23 RX ORDER — KETOROLAC TROMETHAMINE 30 MG/ML
15 INJECTION, SOLUTION INTRAMUSCULAR; INTRAVENOUS EVERY 6 HOURS
Status: DISCONTINUED | OUTPATIENT
Start: 2018-08-23 | End: 2018-08-24 | Stop reason: HOSPADM

## 2018-08-23 RX ADMIN — SODIUM CHLORIDE, POTASSIUM CHLORIDE, SODIUM LACTATE AND CALCIUM CHLORIDE: 600; 310; 30; 20 INJECTION, SOLUTION INTRAVENOUS at 20:45

## 2018-08-23 RX ADMIN — KETOROLAC TROMETHAMINE 15 MG: 30 INJECTION, SOLUTION INTRAMUSCULAR at 20:51

## 2018-08-23 RX ADMIN — SODIUM CHLORIDE, POTASSIUM CHLORIDE, SODIUM LACTATE AND CALCIUM CHLORIDE: 600; 310; 30; 20 INJECTION, SOLUTION INTRAVENOUS at 20:48

## 2018-08-23 RX ADMIN — SODIUM CHLORIDE: 9 INJECTION, SOLUTION INTRAVENOUS at 00:00

## 2018-08-23 RX ADMIN — DOCUSATE SODIUM -SENNOSIDES 2 TABLET: 50; 8.6 TABLET, COATED ORAL at 05:21

## 2018-08-23 RX ADMIN — KETOROLAC TROMETHAMINE 15 MG: 30 INJECTION, SOLUTION INTRAMUSCULAR at 12:22

## 2018-08-23 RX ADMIN — ACETAMINOPHEN 500 MG: 500 TABLET, FILM COATED ORAL at 12:21

## 2018-08-23 RX ADMIN — CEFTRIAXONE 2 G: 2 INJECTION, POWDER, FOR SOLUTION INTRAMUSCULAR; INTRAVENOUS at 20:46

## 2018-08-23 RX ADMIN — SODIUM CHLORIDE, POTASSIUM CHLORIDE, SODIUM LACTATE AND CALCIUM CHLORIDE: 600; 310; 30; 20 INJECTION, SOLUTION INTRAVENOUS at 10:01

## 2018-08-23 ASSESSMENT — ENCOUNTER SYMPTOMS
NERVOUS/ANXIOUS: 0
INSOMNIA: 0
MEMORY LOSS: 0
NECK PAIN: 0
BLOOD IN STOOL: 0
BACK PAIN: 0
DEPRESSION: 0
CONSTIPATION: 0
CHILLS: 1
COUGH: 0
FALLS: 0
SPUTUM PRODUCTION: 0
HEMOPTYSIS: 0
DIAPHORESIS: 0
HEADACHES: 0
HEARTBURN: 0
HALLUCINATIONS: 0
EYE PAIN: 0
ABDOMINAL PAIN: 1
PALPITATIONS: 0
PHOTOPHOBIA: 0
WHEEZING: 0
CLAUDICATION: 0
NAUSEA: 1
DOUBLE VISION: 0
FEVER: 0
ORTHOPNEA: 0
VOMITING: 0
SINUS PAIN: 0
STRIDOR: 0
FLANK PAIN: 1
WEIGHT LOSS: 0
DIZZINESS: 0
EYE REDNESS: 0
SORE THROAT: 0
SHORTNESS OF BREATH: 0
BLURRED VISION: 0
WEAKNESS: 1
MYALGIAS: 0
PND: 0
EYE DISCHARGE: 0
DIARRHEA: 0

## 2018-08-23 ASSESSMENT — COGNITIVE AND FUNCTIONAL STATUS - GENERAL
DAILY ACTIVITIY SCORE: 24
MOBILITY SCORE: 24
SUGGESTED CMS G CODE MODIFIER MOBILITY: CH
SUGGESTED CMS G CODE MODIFIER DAILY ACTIVITY: CH

## 2018-08-23 ASSESSMENT — LIFESTYLE VARIABLES
SUBSTANCE_ABUSE: 0
EVER_SMOKED: NEVER
ALCOHOL_USE: NO

## 2018-08-23 ASSESSMENT — COPD QUESTIONNAIRES
COPD SCREENING SCORE: 1
DURING THE PAST 4 WEEKS HOW MUCH DID YOU FEEL SHORT OF BREATH: NONE/LITTLE OF THE TIME
IN THE PAST 12 MONTHS DO YOU DO LESS THAN YOU USED TO BECAUSE OF YOUR BREATHING PROBLEMS: DISAGREE/UNSURE
DO YOU EVER COUGH UP ANY MUCUS OR PHLEGM?: NO/ONLY WITH OCCASIONAL COLDS OR INFECTIONS
HAVE YOU SMOKED AT LEAST 100 CIGARETTES IN YOUR ENTIRE LIFE: NO/DON'T KNOW

## 2018-08-23 ASSESSMENT — PAIN SCALES - GENERAL
PAINLEVEL_OUTOF10: 0

## 2018-08-23 ASSESSMENT — PATIENT HEALTH QUESTIONNAIRE - PHQ9
SUM OF ALL RESPONSES TO PHQ9 QUESTIONS 1 AND 2: 0
2. FEELING DOWN, DEPRESSED, IRRITABLE, OR HOPELESS: NOT AT ALL
1. LITTLE INTEREST OR PLEASURE IN DOING THINGS: NOT AT ALL

## 2018-08-23 NOTE — PROGRESS NOTES
Telephone  used. Explained to patient that transport will be arriving in approximately 15 mins to take her for surgery. Also informed her she is to remove all jewelery and all belongings/ clothing underneath her hospital gown must be removed and remain in her room until she returns from surgery. Patient verbalizes understanding.

## 2018-08-23 NOTE — PROGRESS NOTES
Admit from the ER, alert x4 but French speaking only- language line used through the telephone for admit profile. NPO with sips for meds for a procedure tomorrow.  Skin check completed with alphonso peña- no skin issues noted.  Denies pain at present but would rather have morphine when needed.  Medication flomax ordered and she states she doesn't take this medication at home and she would rather talk to the MD in the morning about it before taking the medication.  Has wallet, purse and clothes at the bedside; wants to keep it with her at the bedside rather than having it taken home or locked up.  [place] to room, call light within reach and visual checks through the night. Understands how to call if assistance needed  Denied pain overnight

## 2018-08-23 NOTE — PROGRESS NOTES
[facility] Medicine Daily Progress Note    Date of Service  8/23/2018    Chief Complaint  28 y.o. female admitted 8/22/2018 with N/V, pain 2/2 ureteral stone failing outpatient conservative management at this time     Hospital Course    Patient recently discharged from the hospital on August 20, 2018, at that time CT renal revealing an obstructive 5 mm calculus in the distal left ureter with moderate left-sided hydronephrosis.  She failed conservative management and presented again to the emergency department on August 22, 2018.  Neurology team is consulting.  She is on empiric antibiotics.      Interval Problem Update  Patient seen and evaluated on rounds  Still having left-sided costovertebral angle tenderness, positive Colmenares's punch  Nausea and vomiting has improved  Pain has somewhat improved  Still lethargic and weak  Awaiting urology decision regarding OR, if nonsurgical would recommend obtaining a KUB and ultrasound renal    Consultants/Specialty  Urology     Disposition  Likely home tomorrow     Review of Systems  Review of Systems   Constitutional: Positive for chills and malaise/fatigue. Negative for diaphoresis, fever and weight loss.   HENT: Negative for congestion, ear discharge, ear pain, hearing loss, nosebleeds, sinus pain, sore throat and tinnitus.    Eyes: Negative for blurred vision, double vision, photophobia, pain, discharge and redness.   Respiratory: Negative for cough, hemoptysis, sputum production, shortness of breath, wheezing and stridor.    Cardiovascular: Negative for chest pain, palpitations, orthopnea, claudication, leg swelling and PND.   Gastrointestinal: Positive for abdominal pain and nausea. Negative for blood in stool, constipation, diarrhea, heartburn, melena and vomiting.   Genitourinary: Positive for flank pain and frequency. Negative for dysuria, hematuria and urgency.   Musculoskeletal: Negative for back pain, falls, joint pain, myalgias and neck pain.   Skin: Negative for  itching and rash.   Neurological: Positive for weakness. Negative for dizziness and headaches.   Psychiatric/Behavioral: Negative for depression, hallucinations, memory loss, substance abuse and suicidal ideas. The patient is not nervous/anxious and does not have insomnia.         Physical Exam  Blood Pressure: 102/65   Temperature: 36.8 °C (98.2 °F)   Pulse: 65   Respiration: 18   Pulse Oximetry: 96 %     Physical Exam   Constitutional: She is oriented to person, place, and time. She appears well-developed and well-nourished. No distress.   HENT:   Head: Normocephalic.   Mouth/Throat: Oropharynx is clear and moist. No oropharyngeal exudate.   Eyes: Pupils are equal, round, and reactive to light. Conjunctivae are normal. No scleral icterus.   Neck: No JVD present.   Cardiovascular: Normal rate, regular rhythm and normal heart sounds.  Exam reveals no gallop and no friction rub.    No murmur heard.  Pulses:       Posterior tibial pulses are 2+ on the right side, and 2+ on the left side.   Cap refill < 3s   Pulmonary/Chest: Breath sounds normal. No stridor. No respiratory distress. She has no wheezes. She has no rales.   Abdominal: Soft. Bowel sounds are normal. She exhibits no distension. There is no tenderness. There is no rebound and no guarding.   L CVA tenderness, positive soriano punch    Musculoskeletal: Normal range of motion. She exhibits no edema, tenderness or deformity.   Neurological: She is alert and oriented to person, place, and time. No cranial nerve deficit.   Skin: Skin is warm and dry. She is not diaphoretic.   Psychiatric: She has a normal mood and affect. Her behavior is normal. Judgment and thought content normal.       Fluids    Intake/Output Summary (Last 24 hours) at 08/23/18 0908  Last data filed at 08/23/18 0500   Gross per 24 hour   Intake               60 ml   Output                0 ml   Net               60 ml       Laboratory  Recent Labs      08/22/18 2049   WBC  12.6*   RBC  4.20    HEMOGLOBIN  12.8   HEMATOCRIT  36.8*   MCV  87.6   MCH  30.5   MCHC  34.8   RDW  38.5   PLATELETCT  251   MPV  10.1     Recent Labs      08/22/18   2049   SODIUM  134*   POTASSIUM  4.2   CHLORIDE  101   CO2  25   GLUCOSE  96   BUN  11   CREATININE  1.09   CALCIUM  8.9                   Imaging  No orders to display        Assessment/Plan  * Left ureterolithiasis with moderate hydronephrosis- (present on admission)   Assessment & Plan    Recurrent.    Urology team on board  Plan cystoscopy / stone treatment today   Dr Hendrix evaluating   If no surgery, then obtain KUB / US renal with assessment of ureteral jets  Continue flomax  IVF hydration  Pain control with scheduled tylenol, toradol, breakthrough with oxycodone   Continue ceftriaxone        LENNIE (acute kidney injury) (HCC)- (present on admission)   Assessment & Plan    Persistent  Obstructive  Urology on board  Flomax / IVF hydration         Obstructive pyelonephritis- (present on admission)   Assessment & Plan    IV ceftriaxone, de escalate as able        Hyponatremia- (present on admission)   Assessment & Plan    IVF hydration         Bandemia- (present on admission)   Assessment & Plan    2/2 Pyelo  Continue Abx  De escalate as able

## 2018-08-23 NOTE — ED PROVIDER NOTES
ED Provider Note    Scribed for Gypsy Colmenares M.D. by Brian Ferro. 8/22/2018, 9:05 PM.    Primary care provider: No primary care provider noted  Means of arrival: Walk-in  History obtained from: patient  History limited by: none    CHIEF COMPLAINT  Chief Complaint   Patient presents with   • Nephrolithiasis     Patient was discharge yesterday after being admitted for kidney stone. Patient states that she is still in pain.    • N/V       HPI  Katja Salomon is a 28 y.o. female who presents to the Emergency Department with complaints of worsening left lower quadrant pain. Per patient, yesterday she was in the hospital for the same complaint and she was diagnosed with obstructive nephrolithiasis with associated infection. The patient was discharged home with a prescription of Cefdinir after being evaluated by urology and was advised to return with any worsening symptoms. She explains that today she has been experiencing recurrent left flank pain that radiates to the groin with associated nausea and vomiting. She describes the pain as sharp and moderate in severity. She states she has been unable to tolerate oral intake throughout the day secondary to the pain and the nausea.  She denies any exacerbating or alleviating factors.  She has been taking her antibiotics as prescribed.    REVIEW OF SYSTEMS  Review of Systems   Constitutional: Positive for chills. Negative for fever.   Respiratory: Negative for shortness of breath.    Cardiovascular: Negative for chest pain.   Gastrointestinal: Positive for abdominal pain (left lower quadrant), nausea and vomiting.        Negative for radiating pain   Genitourinary: Positive for dysuria, flank pain and hematuria.   All other systems reviewed and are negative.    C.     PAST MEDICAL HISTORY   has a past medical history of Kidney stones.    SURGICAL HISTORY  patient denies any surgical history    SOCIAL HISTORY  Social History   Substance Use Topics   • Smoking  "status: Never Smoker   • Smokeless tobacco: Never Used   • Alcohol use No      History   Drug Use No       FAMILY HISTORY  Family History   Problem Relation Age of Onset   • Hypertension Mother    • No Known Problems Father        CURRENT MEDICATIONS  Home Medications     Reviewed by Arianne Ovalle (Pharmacy Tech) on 08/22/18 at 2240  Med List Status: Complete   Medication Last Dose Status   cefdinir (OMNICEF) 300 MG Cap 8/22/2018 Active   ondansetron (ZOFRAN ODT) 4 MG TABLET DISPERSIBLE 8/22/2018 Active   oxyCODONE immediate-release (ROXICODONE) 5 MG Tab 8/22/2018 Active   tamsulosin (FLOMAX) 0.4 MG capsule 8/22/2018 Active                ALLERGIES  No Known Allergies    PHYSICAL EXAM  VITAL SIGNS: /78   Pulse 85   Temp 36.8 °C (98.3 °F)   Resp 16   Ht 1.65 m (5' 4.96\")   Wt 59.4 kg (130 lb 15.3 oz)   LMP 08/19/2018 (Exact Date)   SpO2 97%   BMI 21.82 kg/m²   Vitals reviewed by myself.  Physical Exam   Nursing note and vitals reviewed.  Constitutional: Well-developed and well-nourished. No acute distress.   HENT: Head is normocephalic and atraumatic.  Eyes: extra-ocular movements intact  Cardiovascular: Regular rate and regular rhythm. No murmur heard.  Pulmonary/Chest: Breath sounds normal. No wheezes or rales.   Abdominal: Soft and non-tender. No distention. No point tenderness. No rebound or guarding.   Back: No CVA tenderness.    Musculoskeletal: Extremities exhibit normal range of motion without edema or tenderness.   Neurological: Awake and alert  Skin: Skin is warm and dry. No rash.     DIAGNOSTIC STUDIES /  LABS  Labs Reviewed   CBC WITH DIFFERENTIAL - Abnormal; Notable for the following:        Result Value    WBC 12.6 (*)     Hematocrit 36.8 (*)     Neutrophils-Polys 72.20 (*)     Lymphocytes 16.30 (*)     Neutrophils (Absolute) 9.11 (*)     Monos (Absolute) 1.28 (*)     All other components within normal limits   COMP METABOLIC PANEL - Abnormal; Notable for the following:     Sodium " 134 (*)     All other components within normal limits   URINALYSIS,CULTURE IF INDICATED - Abnormal; Notable for the following:     Character Cloudy (*)     Ketones 40 (*)     Leukocyte Esterase Trace (*)     Occult Blood Small (*)     All other components within normal limits   URINE MICROSCOPIC (W/UA) - Abnormal; Notable for the following:     WBC 5-10 (*)     RBC 2-5 (*)     Bacteria Many (*)     Epithelial Cells Many (*)     Hyaline Cast 3-5 (*)     All other components within normal limits   ESTIMATED GFR - Abnormal; Notable for the following:     GFR If Non  59 (*)     All other components within normal limits   HEMOGLOBIN A1C   URINE CULTURE(NEW)   URINE CULTURE(NEW)   CBC WITH DIFFERENTIAL   BASIC METABOLIC PANEL       All labs reviewed by me.    REASSESSMENT  9:05 PM - Patient seen and examined at bedside. Discussed plan of care, including plans of pain management and further lab evaluation. Patient agrees to the plan of care.     9:39 PM I discussed the patient's case and the above findings with Dr. Hendrix (urologist) who recommends admission to the hospital. The hospitalist was paged at this time.     9:52 PM I discussed the patient's case and the above findings with Dr. Harrell (hospitalist) who agrees to admit the patient.     9:53 PM - Patient was reevaluated at bedside. Patient's pain has improved. Discussed lab results with the patient and informed the patient that she will be admitted to the hospital for further evaluation and care. She understands and verbalizes agreement with the plan of care.     COURSE & MEDICAL DECISION MAKING  Nursing notes, VS, PMSFHx reviewed in chart.    Patient is a 28-year-old female who comes in for flank and abdominal pain.  Differential diagnosis includes obstructive nephrolithiasis, acute kidney injury, urinary tract infection, pyelonephritis.  Diagnostic workup includes labs and urinalysis.  I reviewed labs and imaging from prior admission and elected to  obtain labs and urinalysis for comparison.    Patient's initial vitals are within normal limits.  Abdominal exam is benign.  Patient is treated with morphine, Toradol, and Zofran.  After treatment patient is feeling improved.  Labs returned are notable for slight leukocytosis.  Urinalysis still has white blood cells in it consistent with infection.  I discussed the case with on-call urologist Dr. Hendrix who advises patient be admitted and continued on IV antibiotics, he will follow with the patient was admitted.  Patient is agreeable to the admission.  I discussed the case with Dr. Harrell who has accepted the admission.  At time of admission she is in stable condition.    DISPOSITION:  Patient will be admitted to Dr. Harrell in stable condition.    FINAL IMPRESSION  1. Nephrolithiasis    2. Flank pain          Brian WADE (Scribe), am scribing for, and in the presence of, Gypsy Colmenares M.D..    Electronically signed by: Brian Ferro (Scribe), 8/22/2018    IGypsy M.D. personally performed the services described in this documentation, as scribed by Brian Ferro in my presence, and it is both accurate and complete.    The note accurately reflects work and decisions made by me.  Gypsy Colmenares  8/23/2018  3:46 AM

## 2018-08-23 NOTE — ED NOTES
Used  [id number] to explain medications to pt prior to administering. Pt in agreement and did not have further questions for me.

## 2018-08-23 NOTE — ED TRIAGE NOTES
Chief Complaint   Patient presents with   • Nephrolithiasis     Patient was discharge yesterday after being admitted for kidney stone. Patient states that she is still in pain.    • N/V       Patient states that she is suppose to follow up in 5 weeks with a doctor. Patient states that the pain is still not tolerable. Patient given oxycodone and it is not working.     Patient Bruneian speaking only. IPAD  used. Full triage completed.

## 2018-08-23 NOTE — H&P
Hospital Medicine History & Physical Note    Date of Service  8/22/2018    Primary Care Physician  No primary care provider on file.    Consultants  [facility]    Code Status  Full code     Chief Complaint  Flank pain, left side    History of Presenting Illness  28 y.o. female with recent history of hospitalization for left nephrolithiasis and associated hydronephrosis, was started on Flomax as well as intravenous antibiotic therapy, and then discharged on oral antibiotic therapy.  She reports the day prior to admission, having worsening of her left-sided flank pain.  This had no exacerbating or alleviating factors and was rather constant in nature.  She reports the pain is sharp.  It is associated with shortness of breath when extreme, otherwise no other symptoms besides chills.  She reports being unable to tolerate oral intake, and had some bouts of vomiting as well.  She then presented to the emergency department for further evaluation.    Review of Systems  Review of Systems   Constitutional: Negative.    HENT: Negative.    Eyes: Negative.    Respiratory: Positive for shortness of breath.    Cardiovascular: Negative.    Gastrointestinal: Negative.    Genitourinary: Positive for dysuria and flank pain.   Skin: Negative.    Neurological: Negative.    Endo/Heme/Allergies: Negative.    Psychiatric/Behavioral: Negative.        Past Medical History   has a past medical history of Kidney stones.    Surgical History   has no past surgical history on file.     Family History  family history includes Hypertension in her mother; No Known Problems in her father.     Social History   reports that she has never smoked. She has never used smokeless tobacco. She reports that she does not drink alcohol or use drugs.    Allergies  No Known Allergies    Medications  Prior to Admission Medications   Prescriptions Last Dose Informant Patient Reported? Taking?   cefdinir (OMNICEF) 300 MG Cap 8/22/2018 at 0900 Patient No No   Sig:  Take 1 Cap by mouth 2 times a day for 7 days.   ondansetron (ZOFRAN ODT) 4 MG TABLET DISPERSIBLE 8/22/2018 at 1100 Patient No No   Sig: Take 1 Tab by mouth every four hours as needed for Nausea.   oxyCODONE immediate-release (ROXICODONE) 5 MG Tab 8/22/2018 at 1700 Patient No No   Sig: Take 0.5-1 Tabs by mouth every 3 hours as needed for Severe Pain for up to 15 days.   tamsulosin (FLOMAX) 0.4 MG capsule 8/22/2018 at 0900 Patient No No   Sig: Take 1 Cap by mouth every day.      Facility-Administered Medications: None       Physical Exam  Blood Pressure: 110/76   Temperature: 36.8 °C (98.3 °F)   Pulse: 67   Respiration: 16   Pulse Oximetry: 97 %     Physical Exam   Constitutional: She is oriented to person, place, and time. She appears well-developed and well-nourished. No distress.   HENT:   Head: Normocephalic and atraumatic.   Eyes: Pupils are equal, round, and reactive to light. Conjunctivae are normal.   Neck: Normal range of motion. Neck supple. No tracheal deviation present. No thyromegaly present.   Cardiovascular: Normal rate, regular rhythm and normal heart sounds.  Exam reveals no gallop and no friction rub.    No murmur heard.  Pulmonary/Chest: Effort normal and breath sounds normal. No respiratory distress. She has no wheezes. She has no rales.   Abdominal: Soft. Bowel sounds are normal. She exhibits no distension. There is no tenderness. There is no rebound.   Musculoskeletal: Normal range of motion. She exhibits no edema.   Lymphadenopathy:     She has no cervical adenopathy.   Neurological: She is alert and oriented to person, place, and time. No cranial nerve deficit.   Skin: Skin is warm and dry. She is not diaphoretic.   Psychiatric: She has a normal mood and affect.   Nursing note and vitals reviewed.      Laboratory:  Recent Labs      08/20/18   0300  08/22/18 2049   WBC  11.9*  12.6*   RBC  4.54  4.20   HEMOGLOBIN  13.8  12.8   HEMATOCRIT  40.2  36.8*   MCV  88.5  87.6   MCH  30.4  30.5   MCHC   34.3  34.8   RDW  39.5  38.5   PLATELETCT  292  251   MPV  10.3  10.1     Recent Labs      08/20/18   0300  08/22/18 2049   SODIUM  138  134*   POTASSIUM  3.7  4.2   CHLORIDE  103  101   CO2  25  25   GLUCOSE  137*  96   BUN  15  11   CREATININE  0.97  1.09   CALCIUM  9.3  8.9     Recent Labs      08/20/18   0300  08/22/18 2049   ALTSGPT  24  17   ASTSGOT  22  15   ALKPHOSPHAT  83  68   TBILIRUBIN  0.3  0.4   LIPASE  15   --    GLUCOSE  137*  96                 No results found for: TROPONINI    Urinalysis:    Recent Labs      08/22/18 2049   SPECGRAVITY  1.011   GLUCOSEUR  Negative   KETONES  40*   NITRITE  Negative   LEUKESTERAS  Trace*   WBCURINE  5-10*   RBCURINE  2-5*   BACTERIA  Many*   EPITHELCELL  Many*        Imaging:  No orders to display   No new imaging for review       Assessment/Plan:  I anticipate this patient is appropriate for observation status at this time.    * Left ureterolithiasis with moderate hydronephrosis- (present on admission)   Assessment & Plan    Recurrent.  Plan for urologic evaluation.  Will likely need stent placement.  Dr. Hendrix from urology consulted. Plan for NPO after midnight, pain management, continue flomax        Acute cystitis with hematuria   Assessment & Plan    Continue antibiotic therapy, follow culture results.              VTE prophylaxis: SCD

## 2018-08-23 NOTE — CARE PLAN
Problem: Pain Management  Goal: Pain level will decrease to patient's comfort goal  Outcome: PROGRESSING AS EXPECTED  Patient has had no reports of pain

## 2018-08-23 NOTE — ASSESSMENT & PLAN NOTE
Recurrent.    Urology team on board  Plan cystoscopy / stone treatment today   Dr Hendrix evaluating   If no surgery, then obtain KUB / US renal with assessment of ureteral jets  Continue flomax  IVF hydration  Pain control with scheduled tylenol, toradol, breakthrough with oxycodone   Continue ceftriaxone

## 2018-08-23 NOTE — PROGRESS NOTES
Patient asleep in bed. Easily awakened. Family at bedside. Patient denies pain/sob at this time. IVF changed to LR at 125/hr per MD orders. Patient remains NPO for procedure. She does not appear to be in distress at this time.

## 2018-08-23 NOTE — PROGRESS NOTES
Admit from the ER, alert x4 but Greenlandic speaking only- language line used through the telephone for admit profile. NPO with sips for meds for a procedure tomorrow.  Skin check completed with alphonso peña- no skin issues noted.  Denies pain at present but would rather have morphine when needed.  Medication flomax ordered and she states she doesn't take this medication at home and she would rather talk to the MD in the morning about it before taking the medication.  Has wallet, purse and clothes at the bedside; wants to keep it with her at the bedside rather than having it taken home or locked up.  [place] to room, call light within reach and visual checks through the night. Understands how to call if assistance needed

## 2018-08-23 NOTE — PROGRESS NOTES
"Telephone  services used. Explained to patient that the doctor has ordered her tylenol and a mild pain killer called Toradol every 6 hours scheduled for her pain. Patient verbalizes understanding. When asked if she is currently having pain she states no. Explained to her that these pain medications will prevent the pain from recurring. Patient verbalizes understanding. She asked about the \"catheter\" that she is supposed to get today. Explained to patient that we are waiting for the surgical doctor to see her today and they will determine the need for the kidney stone removal and a stent placement today. Patient verbalizes understanding. No further questions/concerns at this time.   "

## 2018-08-23 NOTE — CONSULTS
UROLOGY Consult Note:    Fuad HOLLIDAY Ruma  Date & Time note created:    8/22/2018   10:06 PM     Referring MD:  Dr. Colmenares    Patient ID:   Name:             Katja Adamson     YOB: 1990  Age:                 28 y.o.  female   MRN:               [id number]                                                             Reason for Consult:      Question of Sepsis and obstructing stone    History of Present Illness:    This is a 28 y.o. female without significant past medical history, who was visiting from [facility], who was doing well until the 2 days  prior to admission, when she has had acute onset left lower quadrant pain, which she described as sharp, constant,non radiating and colicky in nature, it was worse with movment and better with rest. She rated pain 10 out of 10 in severity. The pain was so severe that she felt nauseated, with episodes of vomiting. She went to ED yesterday with these symptons and represents with pain and nausea and in ability to take po.  She denied any other symptoms such as shortness of breath, diarrhea, or dysuria. She has not seen a urologist here in [place] and she is from out of town.    Review of Systems:      Constitutional: Denies fevers, Denies weight changes  Eyes: Denies changes in vision, no eye pain  Ears/Nose/Throat/Mouth: Denies nasal congestion or sore throat   Cardiovascular: no chest pain, no palpitations   Respiratory: no shortness of breath , Denies cough  Gastrointestinal/Hepatic: +abdominal pain,+  nausea,   Genitourinary: Denies hematuria, dysuria or frequency  Musculoskeletal/Rheum: Denies  joint pain and swelling, no edema  Skin: Denies rash  Neurological: Denies headache, confusion, memory loss or focal weakness/parasthesias  Psychiatric: denies mood disorder   Endocrine: Miley thyroid problems  Heme/Oncology/Lymph Nodes: Denies enlarged lymph nodes, denies brusing or known bleeding disorder  All other systems were reviewed and are  "negative (AMA/CMS criteria)                Past Medical History:   Past Medical History:   Diagnosis Date   • Kidney stones      There are no active hospital problems to display for this patient.      Past Surgical History:  History reviewed. No pertinent surgical history.    Hospital Medications:  No current facility-administered medications for this encounter.     Current Outpatient Prescriptions:   •  cefdinir (OMNICEF) 300 MG Cap, Take 1 Cap by mouth 2 times a day for 7 days., Disp: 14 Cap, Rfl: 0  •  tamsulosin (FLOMAX) 0.4 MG capsule, Take 1 Cap by mouth every day., Disp: 30 Cap, Rfl: 0  •  oxyCODONE immediate-release (ROXICODONE) 5 MG Tab, Take 0.5-1 Tabs by mouth every 3 hours as needed for Severe Pain for up to 15 days., Disp: 30 Tab, Rfl: 0  •  ondansetron (ZOFRAN ODT) 4 MG TABLET DISPERSIBLE, Take 1 Tab by mouth every four hours as needed for Nausea., Disp: 20 Tab, Rfl: 0    Current Outpatient Medications:    (Not in a hospital admission)    Medication Allergy:  No Known Allergies    Family History:  History reviewed. No pertinent family history.    Social History:  Social History     Social History   • Marital status: Single     Spouse name: N/A   • Number of children: N/A   • Years of education: N/A     Occupational History   • Not on file.     Social History Main Topics   • Smoking status: Never Smoker   • Smokeless tobacco: Never Used   • Alcohol use No   • Drug use: No   • Sexual activity: Not on file     Other Topics Concern   • Not on file     Social History Narrative   • No narrative on file         Physical Exam:  Vitals/ General Appearance:   Weight/BMI: Body mass index is 21.82 kg/m².  Blood pressure 110/76, pulse 67, temperature 36.8 °C (98.3 °F), resp. rate 16, height 1.65 m (5' 4.96\"), weight 59.4 kg (130 lb 15.3 oz), last menstrual period 08/19/2018, SpO2 97 %.  Vitals:    08/22/18 1817 08/22/18 1823 08/22/18 2114   BP: 118/78  110/76   Pulse: 85  67   Resp: 16  16   Temp: 36.8 °C (98.3 °F) " "    SpO2: 97%     Weight:  59.4 kg (130 lb 15.3 oz)    Height: 1.65 m (5' 4.96\")       Oxygen Therapy:  Pulse Oximetry: 97 %, O2 Delivery: None (Room Air)    Constitutional:   Well developed, Well nourished, No acute distress  HENMT:  Normocephalic, Atraumatic, Oropharynx moist mucous membranes, No oral exudates, Nose normal.  No thyromegaly.  Eyes:  EOMI, Conjunctiva normal, No discharge.  Neck:  Normal range of motion, No cervical tenderness,  no JVD.  Cardiovascular:  Normal heart rate, Normal rhythm, No murmurs, No rubs, No gallops.   Extremitites with intact distal pulses, no cyanosis, or edema.  Lungs:  Normal breath sounds, breath sounds clear to auscultation bilaterally,  no crackles, no wheezing.   Abdomen: Bowel sounds normal, Soft,  Skin: Warm, Dry, No erythema, No rash, no induration.  Neurologic: Alert & oriented x 3, No focal deficits noted, cranial nerves II through X are grossly intact.  Psychiatric: Affect normal, Judgment normal, Mood normal.      MDM (Data Review):     Records reviewed and summarized in current documentation    Lab Data Review:  Recent Results (from the past 24 hour(s))   CBC WITH DIFFERENTIAL    Collection Time: 08/22/18  8:49 PM   Result Value Ref Range    WBC 12.6 (H) 4.8 - 10.8 K/uL    RBC 4.20 4.20 - 5.40 M/uL    Hemoglobin 12.8 12.0 - 16.0 g/dL    Hematocrit 36.8 (L) 37.0 - 47.0 %    MCV 87.6 81.4 - 97.8 fL    MCH 30.5 27.0 - 33.0 pg    MCHC 34.8 33.6 - 35.0 g/dL    RDW 38.5 35.9 - 50.0 fL    Platelet Count 251 164 - 446 K/uL    MPV 10.1 9.0 - 12.9 fL    Neutrophils-Polys 72.20 (H) 44.00 - 72.00 %    Lymphocytes 16.30 (L) 22.00 - 41.00 %    Monocytes 10.10 0.00 - 13.40 %    Eosinophils 0.70 0.00 - 6.90 %    Basophils 0.40 0.00 - 1.80 %    Immature Granulocytes 0.30 0.00 - 0.90 %    Nucleated RBC 0.00 /100 WBC    Neutrophils (Absolute) 9.11 (H) 2.00 - 7.15 K/uL    Lymphs (Absolute) 2.06 1.00 - 4.80 K/uL    Monos (Absolute) 1.28 (H) 0.00 - 0.85 K/uL    Eos (Absolute) 0.09 0.00 " - 0.51 K/uL    Baso (Absolute) 0.05 0.00 - 0.12 K/uL    Immature Granulocytes (abs) 0.04 0.00 - 0.11 K/uL    NRBC (Absolute) 0.00 K/uL   COMP METABOLIC PANEL    Collection Time: 08/22/18  8:49 PM   Result Value Ref Range    Sodium 134 (L) 135 - 145 mmol/L    Potassium 4.2 3.6 - 5.5 mmol/L    Chloride 101 96 - 112 mmol/L    Co2 25 20 - 33 mmol/L    Anion Gap 8.0 0.0 - 11.9    Glucose 96 65 - 99 mg/dL    Bun 11 8 - 22 mg/dL    Creatinine 1.09 0.50 - 1.40 mg/dL    Calcium 8.9 8.5 - 10.5 mg/dL    AST(SGOT) 15 12 - 45 U/L    ALT(SGPT) 17 2 - 50 U/L    Alkaline Phosphatase 68 30 - 99 U/L    Total Bilirubin 0.4 0.1 - 1.5 mg/dL    Albumin 3.8 3.2 - 4.9 g/dL    Total Protein 6.8 6.0 - 8.2 g/dL    Globulin 3.0 1.9 - 3.5 g/dL    A-G Ratio 1.3 g/dL   URINALYSIS,CULTURE IF INDICATED    Collection Time: 08/22/18  8:49 PM   Result Value Ref Range    Color Yellow     Character Cloudy (A)     Specific Gravity 1.011 <1.035    Ph 5.0 5.0 - 8.0    Glucose Negative Negative mg/dL    Ketones 40 (A) Negative mg/dL    Protein Negative Negative mg/dL    Bilirubin Negative Negative    Urobilinogen, Urine 0.2 Negative    Nitrite Negative Negative    Leukocyte Esterase Trace (A) Negative    Occult Blood Small (A) Negative    Micro Urine Req Microscopic    URINE MICROSCOPIC (W/UA)    Collection Time: 08/22/18  8:49 PM   Result Value Ref Range    WBC 5-10 (A) /hpf    RBC 2-5 (A) /hpf    Bacteria Many (A) None /hpf    Epithelial Cells Many (A) /hpf    Hyaline Cast 3-5 (A) /lpf   ESTIMATED GFR    Collection Time: 08/22/18  8:49 PM   Result Value Ref Range    GFR If African American >60 >60 mL/min/1.73 m 2    GFR If Non African American 59 (A) >60 mL/min/1.73 m 2       Imaging/Procedures Review:    Reviewed CT LAbs    MDM (Assessment and Plan):     There are no active hospital problems to display for this patient.      1. Left 5 mm stone with mild obstruction. Pt will be admitted. UCX ordered and will be followed closely to assess for sepsis. Pleae  call if decompensation. ABx per ER UCX for 20 AUG. If show signsof infection will urgently stent. If still having pain tomorrow will intervene as needed with stent or stone removal. Call any questions

## 2018-08-23 NOTE — CARE PLAN
Problem: Infection  Goal: Will remain free from infection    Intervention: Assess signs and symptoms of infection  Iv abx as ordered      Problem: Pain Management  Goal: Pain level will decrease to patient's comfort goal  Outcome: PROGRESSING AS EXPECTED  Denies pain at present, medications available if needed

## 2018-08-23 NOTE — ED NOTES
Used an , ID [id number] to explain to pt that she is receiving Rocephin and that she is being admitted and that we are waiting for a bed assignment. Pt has no questions, needs, or concerns at this time. Will continue to monitor.

## 2018-08-24 ENCOUNTER — PATIENT OUTREACH (OUTPATIENT)
Dept: HEALTH INFORMATION MANAGEMENT | Facility: OTHER | Age: 28
End: 2018-08-24

## 2018-08-24 VITALS
DIASTOLIC BLOOD PRESSURE: 68 MMHG | RESPIRATION RATE: 16 BRPM | OXYGEN SATURATION: 97 % | SYSTOLIC BLOOD PRESSURE: 109 MMHG | HEIGHT: 65 IN | HEART RATE: 53 BPM | BODY MASS INDEX: 23.65 KG/M2 | WEIGHT: 141.98 LBS | TEMPERATURE: 98.4 F

## 2018-08-24 LAB
ALBUMIN SERPL BCP-MCNC: 3.2 G/DL (ref 3.2–4.9)
ALBUMIN/GLOB SERPL: 1.1 G/DL
ALP SERPL-CCNC: 60 U/L (ref 30–99)
ALT SERPL-CCNC: 13 U/L (ref 2–50)
ANION GAP SERPL CALC-SCNC: 10 MMOL/L (ref 0–11.9)
AST SERPL-CCNC: 13 U/L (ref 12–45)
BASOPHILS # BLD AUTO: 0.4 % (ref 0–1.8)
BASOPHILS # BLD: 0.04 K/UL (ref 0–0.12)
BILIRUB SERPL-MCNC: 0.3 MG/DL (ref 0.1–1.5)
BUN SERPL-MCNC: 9 MG/DL (ref 8–22)
CALCIUM SERPL-MCNC: 8.6 MG/DL (ref 8.5–10.5)
CHLORIDE SERPL-SCNC: 102 MMOL/L (ref 96–112)
CO2 SERPL-SCNC: 23 MMOL/L (ref 20–33)
CREAT SERPL-MCNC: 0.69 MG/DL (ref 0.5–1.4)
EOSINOPHIL # BLD AUTO: 0.01 K/UL (ref 0–0.51)
EOSINOPHIL NFR BLD: 0.1 % (ref 0–6.9)
ERYTHROCYTE [DISTWIDTH] IN BLOOD BY AUTOMATED COUNT: 38.3 FL (ref 35.9–50)
GLOBULIN SER CALC-MCNC: 2.8 G/DL (ref 1.9–3.5)
GLUCOSE SERPL-MCNC: 92 MG/DL (ref 65–99)
HCT VFR BLD AUTO: 38.8 % (ref 37–47)
HGB BLD-MCNC: 13.3 G/DL (ref 12–16)
IMM GRANULOCYTES # BLD AUTO: 0.04 K/UL (ref 0–0.11)
IMM GRANULOCYTES NFR BLD AUTO: 0.4 % (ref 0–0.9)
LYMPHOCYTES # BLD AUTO: 1.43 K/UL (ref 1–4.8)
LYMPHOCYTES NFR BLD: 14.6 % (ref 22–41)
MCH RBC QN AUTO: 30 PG (ref 27–33)
MCHC RBC AUTO-ENTMCNC: 34.3 G/DL (ref 33.6–35)
MCV RBC AUTO: 87.4 FL (ref 81.4–97.8)
MONOCYTES # BLD AUTO: 0.44 K/UL (ref 0–0.85)
MONOCYTES NFR BLD AUTO: 4.5 % (ref 0–13.4)
NEUTROPHILS # BLD AUTO: 7.85 K/UL (ref 2–7.15)
NEUTROPHILS NFR BLD: 80 % (ref 44–72)
NRBC # BLD AUTO: 0 K/UL
NRBC BLD-RTO: 0 /100 WBC
PLATELET # BLD AUTO: 323 K/UL (ref 164–446)
PMV BLD AUTO: 10.2 FL (ref 9–12.9)
POTASSIUM SERPL-SCNC: 4.3 MMOL/L (ref 3.6–5.5)
PROT SERPL-MCNC: 6 G/DL (ref 6–8.2)
RBC # BLD AUTO: 4.44 M/UL (ref 4.2–5.4)
SODIUM SERPL-SCNC: 135 MMOL/L (ref 135–145)
WBC # BLD AUTO: 9.8 K/UL (ref 4.8–10.8)

## 2018-08-24 PROCEDURE — 99239 HOSP IP/OBS DSCHRG MGMT >30: CPT | Performed by: INTERNAL MEDICINE

## 2018-08-24 PROCEDURE — 85025 COMPLETE CBC W/AUTO DIFF WBC: CPT

## 2018-08-24 PROCEDURE — A9270 NON-COVERED ITEM OR SERVICE: HCPCS | Performed by: HOSPITALIST

## 2018-08-24 PROCEDURE — 700111 HCHG RX REV CODE 636 W/ 250 OVERRIDE (IP): Performed by: INTERNAL MEDICINE

## 2018-08-24 PROCEDURE — 700102 HCHG RX REV CODE 250 W/ 637 OVERRIDE(OP): Performed by: HOSPITALIST

## 2018-08-24 PROCEDURE — 80053 COMPREHEN METABOLIC PANEL: CPT

## 2018-08-24 PROCEDURE — 700105 HCHG RX REV CODE 258: Performed by: INTERNAL MEDICINE

## 2018-08-24 PROCEDURE — 36415 COLL VENOUS BLD VENIPUNCTURE: CPT

## 2018-08-24 RX ORDER — ACETAMINOPHEN 500 MG
500 TABLET ORAL EVERY 6 HOURS PRN
Qty: 30 TAB | Refills: 0 | Status: SHIPPED | OUTPATIENT
Start: 2018-08-24

## 2018-08-24 RX ORDER — CEFDINIR 300 MG/1
300 CAPSULE ORAL EVERY 12 HOURS
Qty: 8 CAP | Refills: 0 | Status: SHIPPED | OUTPATIENT
Start: 2018-08-26 | End: 2018-08-30

## 2018-08-24 RX ORDER — IBUPROFEN 800 MG/1
800 TABLET ORAL EVERY 8 HOURS PRN
Qty: 30 TAB | Refills: 0 | Status: SHIPPED | OUTPATIENT
Start: 2018-08-24

## 2018-08-24 RX ORDER — CEFDINIR 300 MG/1
300 CAPSULE ORAL EVERY 12 HOURS
Status: DISCONTINUED | OUTPATIENT
Start: 2018-08-25 | End: 2018-08-24 | Stop reason: HOSPADM

## 2018-08-24 RX ADMIN — CEFTRIAXONE SODIUM 1 G: 1 INJECTION, POWDER, FOR SOLUTION INTRAMUSCULAR; INTRAVENOUS at 09:56

## 2018-08-24 RX ADMIN — SODIUM CHLORIDE, POTASSIUM CHLORIDE, SODIUM LACTATE AND CALCIUM CHLORIDE: 600; 310; 30; 20 INJECTION, SOLUTION INTRAVENOUS at 05:39

## 2018-08-24 RX ADMIN — TAMSULOSIN HYDROCHLORIDE 0.4 MG: 0.4 CAPSULE ORAL at 05:38

## 2018-08-24 ASSESSMENT — PAIN SCALES - GENERAL
PAINLEVEL_OUTOF10: 0
PAINLEVEL_OUTOF10: 4

## 2018-08-24 NOTE — CARE PLAN
Problem: Safety  Goal: Will remain free from falls  Outcome: PROGRESSING AS EXPECTED  Able to get up with standby assistance to the bathroom, tolerates well.     Problem: Pain Management  Goal: Pain level will decrease to patient's comfort goal  Outcome: PROGRESSING AS EXPECTED  PRN and scheduled pain meds on board.    08/24/18 0216   OTHER   Pain Scale 0 - 10  4   Non Verbal Scale  Calm   Comfort Goal Comfort at Rest;Comfort with Movement

## 2018-08-24 NOTE — OR SURGEON
Immediate Post OP Note    PreOp Diagnosis: Left distal ureteral stone    PostOp Diagnosis: Stone had passed    Procedure(s):  CYSTOSCOPY, LEFT RETROGRADE PYELOGRAM , DIAGNOSTIC URETEROSCOPY - Wound Class: Clean Contaminated    Surgeon(s):  Fuad Hendrix M.D.    Anesthesiologist/Type of Anesthesia:  Anesthesiologist: Chase Lomeli M.D./General    Surgical Staff:  Circulator: Sera Vuong R.N.  Operating Room Assistant (ORA): Danny Rodriguez  Scrub Person: Mahamed Ruiz R.N.    Specimens removed if any:  None    Estimated Blood Loss: None    Findings: Normal ureter and bladder and kidney on the left side with chest erythema in the distal ureter from stone passage.    Complications: None    Technique: After the patient was properly identified the labs reviewed, the plan discussed with the patient and the patient's family via , the patient expressed verbal understanding of the procedure and desired to proceed having understood the risks and benefits of the procedure the treatment options she opted for surgical exploration and possible removal of stone.   She was then taken to the operative theater placed in spine position were data last use was introduced. She was able to dorsolithotomy position. With care being taken to pad all pressure points and avoid any perturbations of joints may cause injury and this was maintained throughout the procedure.  Surgical timeout was done and buttocks been given on the floor. There were no issues with a timeout. The proper patient site and procedure were identified and all the room agreed to proceed.  We began the procedure by introducing a 21 Irish sheath. Urethra into the bladder with findings as mentioned above. We cannulized the left UO. We then introduced the dual lumen. Shot a retrograde. We did not see a stone. The retrograde was normal.  We then went up with a flexible scope followed up in the kidney inspected all the calyces of ureter and there was no stone.  We saw that the erythema the distal ureter was from the recent stone passage. He did not strain a stone on the floor. The patient was still symptomatic preop.  Bladder was emptied and the procedure was terminated patient tolerated procedure well.        8/23/2018 6:32 PM Fuad Hendrix M.D.

## 2018-08-24 NOTE — DISCHARGE INSTRUCTIONS
Discharge Instructions    Discharged to home by car with relative. Discharged via wheelchair, hospital escort: Yes.  Special equipment needed: Not Applicable    Be sure to schedule a follow-up appointment with your primary care doctor or any specialists as instructed.     Discharge Plan:   Diet Plan: Discussed (Regular)  Activity Level: Discussed (as tolerated)  Confirmed Follow up Appointment: Appointment Scheduled  Confirmed Symptoms Management: Discussed  Medication Reconciliation Updated: Yes  Influenza Vaccine Indication: Indicated: Not available from distributor/    I understand that a diet low in cholesterol, fat, and sodium is recommended for good health. Unless I have been given specific instructions below for another diet, I accept this instruction as my diet prescription.   Other diet: Regular    Special Instructions: None    · Is patient discharged on Warfarin / Coumadin?   No     Depression / Suicide Risk    As you are discharged from this [facility] facility, it is important to learn how to keep safe from harming yourself.    Recognize the warning signs:  · Abrupt changes in personality, positive or negative- including increase in energy   · Giving away possessions  · Change in eating patterns- significant weight changes-  positive or negative  · Change in sleeping patterns- unable to sleep or sleeping all the time   · Unwillingness or inability to communicate  · Depression  · Unusual sadness, discouragement and loneliness  · Talk of wanting to die  · Neglect of personal appearance   · Rebelliousness- reckless behavior  · Withdrawal from people/activities they love  · Confusion- inability to concentrate     If you or a loved one observes any of these behaviors or has concerns about self-harm, here's what you can do:  · Talk about it- your feelings and reasons for harming yourself  · Remove any means that you might use to hurt yourself (examples: pills, rope, extension cords,  firearm)  · Get professional help from the community (Mental Health, Substance Abuse, psychological counseling)  · Do not be alone:Call your Safe Contact- someone whom you trust who will be there for you.  · Call your local CRISIS HOTLINE 280-0360 or 653-351-2757  · Call your local Children's Mobile Crisis Response Team [place] (910) 657-2134 or www.GeoOP  · Call the toll free National Suicide Prevention Hotlines   · National Suicide Prevention Lifeline 646-478-VPWF (0808)  · [employer] Line Network 800-SUICIDE (357-3617)        Infección urinaria en los adultos  (Urinary Tract Infection, Adult)  Raeann infección urinaria (IU) puede ocurrir en cualquier lugar de las vías urinarias. Las vías urinarias incluyen lo siguiente:  · Riñones.  · Uréteres.  · Vejiga.  · Uretra.  Estos órganos fabrican, almacenan y eliminan la orina del organismo.  CUIDADOS EN EL HOGAR  · Minkler los medicamentos de venta yakelin y los recetados solamente dwight se lo haya indicado el médico.  · Si le recetaron un antibiótico, tómelo dwight se lo haya indicado el médico. No deje de staci los antibióticos aunque comience a sentirse mejor.  · Evite beber lo siguiente:  ¨ Alcohol.  ¨ Cafeína.  ¨ Té.  ¨ Bebidas con gas.  · Pamella suficiente líquido para mantener el pis katelynn o de color amarillo pálido.  · Concurra a todas las visitas de control dwight se lo haya indicado el médico. Idanha es importante.  · Asegúrese de lo siguiente:  ¨ Vaciar la vejiga con frecuencia y en israel totalidad. No contener la orina sam largos períodos.  ¨ Vaciar la vejiga antes y después de tener relaciones sexuales.  ¨ Limpiar de adelante hacia atrás después de defecar, si es edilberto. Usar cada trozo de papel raeann vez cuando se limpie.  SOLICITE AYUDA SI:  · Siente dolor en la espalda.  · Tiene fiebre.  · Siente malestar estomacal (náuseas).  · Vomita.  · Los síntomas no mejoran después de 3 días de tratamiento.  · Los síntomas desaparecen y luego  reaparecen.  SOLICITE AYUDA DE INMEDIATO SI:  · Siente un dolor muy intenso en la espalda.  · Siente un dolor muy intenso en la parte inferior del abdomen.  · Tiene vómitos y no puede retener los medicamentos ni el agua.  Esta información no tiene dwight fin reemplazar el consejo del médico. Asegúrese de hacerle al médico cualquier pregunta que tenga.  Document Released: 06/07/2011 Document Revised: 04/10/2017 Document Reviewed: 11/07/2016  Elsevier Interactive Patient Education © 2017 [employer] Inc[employer]

## 2018-08-24 NOTE — PROGRESS NOTES
Pt A&Ox4; language line used for bedside report and initial assessment. No c/o pain at this time. Fall precautions in place; pt educated to call as needed.       1020 edit: All discharge teaching finished using Language Line iPad with  Darline. Pt stated she has a ride home with family at 1100.

## 2018-08-24 NOTE — PROGRESS NOTES
Assumed care of pt. Awake, alert and orientedx4. [place] speaking mostly, utilized language line services for assessment.  is Kalie with ID #[id number].    Pt. Came back s/p cystoscopy, pyelogram and diagnostic ureteroscopy around 1930. Pt. Is awake and alert when she came back. Denies any complaint of pain, noted some pink tinged urine which was expected since she just passed a stone, no noted active bleeding. Fall prec in place. Call light within reach. Diet resumed post procedure per protocol. Due meds given and tolerated. Needs attended.

## 2018-08-24 NOTE — DISCHARGE SUMMARY
Discharge Summary    CHIEF COMPLAINT ON ADMISSION  Chief Complaint   Patient presents with   • Nephrolithiasis     Patient was discharge yesterday after being admitted for kidney stone. Patient states that she is still in pain.    • N/V       Reason for Admission  Abd Pain/Vomiting     Admission Date  8/22/2018    CODE STATUS  Full Code    HPI & HOSPITAL COURSE  This is a 28 y.o. female here with Flank pain and nausea and vomiting.  Patient recently discharged from the hospital on August 20, 2018, at that time CT renal revealing an obstructive 5 mm calculus in the distal left ureter with moderate left-sided hydronephrosis.  She failed conservative management and presented again to the emergency department on August 22, 2018.  Urology team was consulted, patient was taken to the operating room on August 23, 2018 where a cystoscopy with left retrograde pyelogram and diagnostic ureteroscopy revealed no evidence of underlying stone, patient has likely passed a stone.  She was treated with antibiotics and pain control during the hospital stay.  She has symptomatically improved at this time.  She will continue Zofran following discharge as needed.  In addition she will take Omnicef for another 4 days to complete a total of 7 days of antibiotic therapy.  Ibuprofen/Tylenol as needed for pain.  For now to continue Flomax, outpatient follow-up with primary care physician and urology.  Discharge planning discussed with the patient, patient discharged home in stable condition.  Hospital schedulers notified by me to arrange outpatient follow-up with PCP.    Therefore, she is discharged in good and stable condition to home with close outpatient follow-up.    The patient met 2-midnight criteria for an inpatient stay at the time of discharge.    Discharge Date  08/24/18    FOLLOW UP ITEMS POST DISCHARGE  F/U PCP / Urology     DISCHARGE DIAGNOSES  Principal Problem:    Left ureterolithiasis with moderate hydronephrosis POA:  Yes  Active Problems:    Obstructive pyelonephritis POA: Yes    LENNIE (acute kidney injury) (Formerly [facility]) POA: Yes    Bandemia POA: Yes    Hyponatremia POA: Yes  Resolved Problems:    * No resolved hospital problems. *      FOLLOW UP  No future appointments.  No follow-up provider specified.    MEDICATIONS ON DISCHARGE     Medication List      START taking these medications      Instructions   acetaminophen 500 MG Tabs  Commonly known as:  TYLENOL   Take 1 Tab by mouth every 6 hours as needed.  Dose:  500 mg     ibuprofen 800 MG Tabs  Commonly known as:  MOTRIN   Take 1 Tab by mouth every 8 hours as needed for Moderate Pain.  Dose:  800 mg        CHANGE how you take these medications      Instructions   cefdinir 300 MG Caps  Start taking on:  8/26/2018  What changed:  · when to take this  · These instructions start on 8/26/2018. If you are unsure what to do until then, ask your doctor or other care provider.  Commonly known as:  OMNICEF   Take 1 Cap by mouth every 12 hours for 4 days.  Dose:  300 mg        CONTINUE taking these medications      Instructions   ondansetron 4 MG Tbdp  Commonly known as:  ZOFRAN ODT   Take 1 Tab by mouth every four hours as needed for Nausea.  Dose:  4 mg     tamsulosin 0.4 MG capsule  Commonly known as:  FLOMAX   Take 1 Cap by mouth every day.  Dose:  0.4 mg        STOP taking these medications    oxyCODONE immediate-release 5 MG Tabs  Commonly known as:  ROXICODONE            Allergies  No Known Allergies    DIET  Orders Placed This Encounter   Procedures   • Diet Order Regular     Standing Status:   Standing     Number of Occurrences:   1     Order Specific Question:   Diet:     Answer:   Regular [1]       ACTIVITY  As tolerated.  Weight bearing as tolerated    CONSULTATIONS  Urology    PROCEDURES  Cystoscopy    LABORATORY  Lab Results   Component Value Date    SODIUM 135 08/24/2018    POTASSIUM 4.3 08/24/2018    CHLORIDE 102 08/24/2018    CO2 23 08/24/2018    GLUCOSE 92 08/24/2018    BUN 9  08/24/2018    CREATININE 0.69 08/24/2018        Lab Results   Component Value Date    WBC 9.8 08/24/2018    HEMOGLOBIN 13.3 08/24/2018    HEMATOCRIT 38.8 08/24/2018    PLATELETCT 323 08/24/2018        Total time of the discharge process exceeds 39 minutes.

## 2018-08-24 NOTE — CARE PLAN
Problem: Communication  Goal: The ability to communicate needs accurately and effectively will improve  Outcome: PROGRESSING AS EXPECTED  Language line used to communicate bedside report and complete assessment questions with patient.     Problem: Infection  Goal: Will remain free from infection  Outcome: PROGRESSING AS EXPECTED  Pt participating in IV antibiotic therapy per MD orders.

## 2018-08-25 LAB
BACTERIA BLD CULT: NORMAL
BACTERIA BLD CULT: NORMAL
BACTERIA UR CULT: NORMAL
SIGNIFICANT IND 70042: NORMAL
SITE SITE: NORMAL
SOURCE SOURCE: NORMAL

## 2021-11-01 NOTE — ED NOTES
Med Rec complete per Pt at bedside and RX bottles (returned)  Ok per Pt to discuss medications with visitor/s present    Allergies Reviewed    Pt started a 7 day course of CEFDINIR on 8/20   no pain, swelling or deformity of joints

## 2024-01-02 NOTE — OR NURSING
PATIENT TOLERATED RECOVERY WELL. VS WDL. NO C/O PAIN OR NAUSEA. ABLE TO TAKE SIPS OF WATER. REPORT CALLED TO [facility] FOR TRANSPORT BACK TO ROOM.   Respiratory called for ABG.

## (undated) DEVICE — TUBING CLEARLINK DUO-VENT - C-FLO (48EA/CA)

## (undated) DEVICE — SENSOR SPO2 NEO LNCS ADHESIVE (20/BX) SEE USER NOTES

## (undated) DEVICE — TOWELS CLOTH SURGICAL - (4/PK 20PK/CA)

## (undated) DEVICE — BAG URODRAIN WITH TUBING - (20/CA)

## (undated) DEVICE — TUBE CONNECT SUCTION CLEAR 120 X 1/4" (50EA/CA)"

## (undated) DEVICE — SYRINGE 20 ML LL (50EA/BX 4BX/CA)

## (undated) DEVICE — TUBE E-T HI-LO CUFF 7.0MM (10EA/PK)

## (undated) DEVICE — LACTATED RINGERS INJ 1000 ML - (14EA/CA 60CA/PF)

## (undated) DEVICE — ZIPWIRE .038 STRAIGHT BENTSON TIP (5EA/BX)

## (undated) DEVICE — HEAD HOLDER JUNIOR/ADULT

## (undated) DEVICE — WATER IRRIG. STER 3000 ML - (4/CA)

## (undated) DEVICE — PROTECTOR ULNA NERVE - (36PR/CA)

## (undated) DEVICE — MASK ANESTHESIA ADULT  - (100/CA)

## (undated) DEVICE — KIT ROOM DECONTAMINATION

## (undated) DEVICE — CATHETER URET DUAL LUMEN

## (undated) DEVICE — GLOVE BIOGEL SZ 8 SURGICAL PF LTX - (50PR/BX 4BX/CA)

## (undated) DEVICE — KIT ANESTHESIA W/CIRCUIT & 3/LT BAG W/FILTER (20EA/CA)

## (undated) DEVICE — SET LEADWIRE 5 LEAD BEDSIDE DISPOSABLE ECG (1SET OF 5/EA)

## (undated) DEVICE — GLOVE BIOGEL SZ 7.5 SURGICAL PF LTX - (50PR/BX 4BX/CA)

## (undated) DEVICE — CONTAINER SPECIMEN BAG OR - STERILE 4 OZ W/LID (100EA/CA)

## (undated) DEVICE — PACK CYSTOSCOPY - (14/CA)

## (undated) DEVICE — JELLY, KY 2 0Z STERILE

## (undated) DEVICE — SUCTION INSTRUMENT YANKAUER BULBOUS TIP W/O VENT (50EA/CA)

## (undated) DEVICE — SET EXTENSION WITH 2 PORTS (48EA/CA) ***PART #2C8610 IS A SUBSTITUTE*****

## (undated) DEVICE — SCOPE DIGITAL URETEROSCOPE DISPOSABLE

## (undated) DEVICE — SET IRRIGATION CYSTOSCOPY Y-TYPE L81 IN (20EA/CA)

## (undated) DEVICE — ELECTRODE 850 FOAM ADHESIVE - HYDROGEL RADIOTRNSPRNT (50/PK)

## (undated) DEVICE — CANISTER SUCTION 3000ML MECHANICAL FILTER AUTO SHUTOFF MEDI-VAC NONSTERILE LF DISP  (40EA/CA)

## (undated) DEVICE — WATER IRRIGATION STERILE 1000ML (12EA/CA)

## (undated) DEVICE — GOWN SURGEONS X-LARGE - DISP. (30/CA)

## (undated) DEVICE — GOWN WARMING STANDARD FLEX - (30/CA)

## (undated) DEVICE — GOWN SURGICAL X-LARGE ULTRA - FILM-REINFORCED (20/CA)

## (undated) DEVICE — NEPTUNE 4 PORT MANIFOLD - (20/PK)